# Patient Record
Sex: FEMALE | Race: WHITE
[De-identification: names, ages, dates, MRNs, and addresses within clinical notes are randomized per-mention and may not be internally consistent; named-entity substitution may affect disease eponyms.]

---

## 2017-02-13 ENCOUNTER — HOSPITAL ENCOUNTER (INPATIENT)
Dept: HOSPITAL 68 - ERH | Age: 67
LOS: 2 days | DRG: 190 | End: 2017-02-15
Attending: INTERNAL MEDICINE | Admitting: INTERNAL MEDICINE
Payer: COMMERCIAL

## 2017-02-13 VITALS — BODY MASS INDEX: 29.23 KG/M2 | WEIGHT: 165 LBS | HEIGHT: 63 IN

## 2017-02-13 DIAGNOSIS — Z99.81: ICD-10-CM

## 2017-02-13 DIAGNOSIS — Z87.891: ICD-10-CM

## 2017-02-13 DIAGNOSIS — J44.1: Primary | ICD-10-CM

## 2017-02-13 DIAGNOSIS — M19.90: ICD-10-CM

## 2017-02-13 DIAGNOSIS — E78.5: ICD-10-CM

## 2017-02-13 DIAGNOSIS — M79.7: ICD-10-CM

## 2017-02-13 DIAGNOSIS — Z79.84: ICD-10-CM

## 2017-02-13 DIAGNOSIS — J96.21: ICD-10-CM

## 2017-02-13 DIAGNOSIS — E11.9: ICD-10-CM

## 2017-02-13 LAB
ABSOLUTE GRANULOCYTE CT: 4.2 /CUMM (ref 1.4–6.5)
APTT BLD: 29 SEC (ref 25–37)
BASOPHILS # BLD: 0 /CUMM (ref 0–0.2)
BASOPHILS NFR BLD: 0.3 % (ref 0–2)
EOSINOPHIL # BLD: 0.3 /CUMM (ref 0–0.7)
EOSINOPHIL NFR BLD: 4.2 % (ref 0–5)
ERYTHROCYTE [DISTWIDTH] IN BLOOD BY AUTOMATED COUNT: 13.1 % (ref 11.5–14.5)
GRANULOCYTES NFR BLD: 57.4 % (ref 42.2–75.2)
HCT VFR BLD CALC: 38.8 % (ref 37–47)
LYMPHOCYTES # BLD: 2.1 /CUMM (ref 1.2–3.4)
MCH RBC QN AUTO: 30 PG (ref 27–31)
MCHC RBC AUTO-ENTMCNC: 33.7 G/DL (ref 33–37)
MCV RBC AUTO: 88.8 FL (ref 81–99)
MONOCYTES # BLD: 0.7 /CUMM (ref 0.1–0.6)
PLATELET # BLD: 273 /CUMM (ref 130–400)
PMV BLD AUTO: 8.6 FL (ref 7.4–10.4)
PROTHROMBIN TIME: 10.6 SEC (ref 9.4–12.5)
RED BLOOD CELL CT: 4.37 /CUMM (ref 4.2–5.4)
WBC # BLD AUTO: 7.2 /CUMM (ref 4.8–10.8)

## 2017-02-13 PROCEDURE — 2NBSP: CPT

## 2017-02-13 NOTE — ULTRASOUND REPORT
EXAMINATION:
US TRIPLEX LOWER EXTREMITY, BILATERAL
 
CLINICAL INFORMATION:
Cramping.
 
COMPARISON:
None
 
TECHNIQUE:
Color-flow triplex imaging with spectral analysis and compression Doppler
were performed on the bilateral lower extremities.
 
FINDINGS:
Respiratory variation, normal compression and augmented flow are noted
throughout the bilateral lower extremities. The visualized common femoral
vein, superficial femoral vein, profunda femoral vein, popliteal vein and
midcalf peroneal and posterior tibial venous segments show no evidence of
deep venous thrombosis.
 
There is no Baker's cyst.
 
IMPRESSION:
Normal triplex scan without evidence of deep venous thrombosis involving the
bilateral lower extremities.

## 2017-02-13 NOTE — NUR
IV ACCESS ESTABLISHED #20 LFA
LABS DRAWN/SENT (SST, LAVENDER AND BLUE TOP TUBES)
RESP PAGED FOR NEB TREATMENT

## 2017-02-13 NOTE — ED DYSPNEA/ASTHMA COMPLAINT
History of Present Illness
 
General
Chief Complaint: Dyspnea (COPD, CHF, Other)
Stated Complaint: LOW O2 SENT BY JOSE
Source: patient, old records
Exam Limitations: no limitations
 
Vital Signs & Intake/Output
Vital Signs & Intake/Output
 Vital Signs
 
 
Date Time Temp Pulse Resp B/P Pulse O2 O2 Flow FiO2
 
     Ox Delivery Rate 
 
02/15 0000     96 Nasal 1.0L 
 
      Cannula  
 
 2340 98.6 80 20 90/50 94 Nasal 2.0L 
 
      Cannula  
 
 2046     94 Nasal 2.0L 
 
      Cannula  
 
 1702     94 Nasal 2.0L 
 
      Cannula  
 
 1558 98.5 84 19 110/60 96 Nasal 2.0L 
 
      Cannula  
 
 1549      Nasal 2.0L 
 
      Cannula  
 
 
 ED Intake and Output
 
 
 02/15 0000  1200
 
Intake Total 1070 260
 
Output Total  
 
Balance 1070 260
 
   
 
Intake, IV 50 20
 
Intake, Oral 1020 240
 
Number 0 1
 
Bowel  
 
Movements  
 
 
Allergies
Coded Allergies:
ampicillin (UNKNOWN 16)
atorvastatin (UNKNOWN 16)
ciprofloxacin (From CIPRO) (UNKNOWN 16)
dicyclomine (UNKNOWN 16)
estradiol (UNKNOWN 16)
simvastatin (UNKNOWN 16)
sulfamethoxazole (From SEPTRA) (DIARRHEA 16)
trimethoprim (From SEPTRA) (DIARRHEA 16)
 
Triage Note:
PT TO C/O SOB. STATES SHE WEARS O2 2.5L AT NIGHT.
 PT CALLED DR GARCIA WHO ADVISED PT TO COME TO ED
 FOR EVAL. RA SATS 94% IN TRIAGE. NO OBVIOUS RESP
 DISTRESS NOTED. PT DENIES PAIN.
Triage Nurses Notes Reviewed? yes
Onset: Gradual
Duration: day(s): (5)
Timing: recent history
Severity: moderate
Activities at Onset: none
Prior Episodes/Possible Cause: occasional episodes
Modifying Factors:
Improves With: immobilization. 
HPI:
Patient is a 66-year-old female with history of COPD presenting to the emergency
department which chief complaint increasing shortness of breath 5 days.  She 
reports that she usually uses 2 L nasal cannula at night but has required oxygen
all day everyday for 5 days.  She called her pulmonologist who told her to come 
to the emergency department for evaluation.  She reports nonproductive cough.  
No fevers or chills.  No centrality or denies recent antibiotic use.  Exertion 
seems to make the symptoms worse nothing seems to make it better.  Patient also 
reports she feels tight and has been wheezing more often.  Denies chest pain or 
palpitations.
(MARGARET DHILLON,MISHA)
Reconcile Medications
Albuterol Sulfate (Ventolin Hfa) 90 MCG HFA.AER.AD   2 PUF INH Q4-6 PRN PRN 
BREATHING PROBLEMS  (Reported)
Amitriptyline HCl 25 MG TABLET   1 TAB PO QPM FIBRO  (Reported)
Cholecalciferol (Vitamin D3) (Vitamin D) 2,000 UNIT TABLET   4,000 UNIT PO DAILY
SUPP  (Reported)
Citalopram Hydrobromide (Citalopram HBr) 40 MG TABLET   1 TAB PO DAILY MENTAL 
HEALTH  (Reported)
Clonazepam 0.5 MG TABLET   1 TAB PO DAILY AS NEEDED AXNITY  (Reported)
Cyanocobalamin (Vitamin B-12) 1,000 MCG TABLET   1 TAB PO DAILY SUPP  (Reported)
Fish Oil/Borage/Flax/Om3,6,9#1 (Omega 3-6-9 1,200 MG Softgel) 1,200 MG CAPSULE  
1 CAP PO DAILY SUUP  (Reported)
Fluticasone/Salmeterol (Advair 250-50 Diskus) 250 MCG-50 MCG/DOSE BLST.W.DEV   1
PUF INH BID BREATHING PROBLEMS  (Reported)
Melatonin (Melatin) 3 MG TABLET   3 MG PO DAILY INSOMNIA  (Reported)
Metformin HCl (Metformin HCl ER) 500 MG TAB.ER.24H   1 TAB PO SEE ADMIN CRITERIA
DIABETES  (Reported)
     1 TAB MORNING 2 TAB EVENING
Pantoprazole Sodium 40 MG TABLET.DR   1 TAB PO DAILY GI  (Reported)
Rosuvastatin Calcium (Crestor) 10 MG TABLET   1 TAB PO DAILY CHOLESTEROL  (
Reported)
Tiotropium Bromide (Spiriva) 18 MCG CAP.W.DEV   1 CAP INH DAILY BREATHING 
PROBLEMS  (Reported)
 
(YONAS WALTER,JONATHAN)
 
Past History
 
Travel History
Traveled to [place] past 21 day No
 
Medical History
Any Pertinent Medical History? see below for history
Respiratory: COPD, 02 PRN
Musculoskeletal: fibromyalgia, osteoarthritis
Endocrine: diabetes
Pneumonia Vaccine: 11
Influenza Vaccine: 10/27/15
 
Surgical History
Surgical History: non-contributory
 
Psychosocial History
Who do you live with Spouse
[facility] at Home Oxygen
What is your primary language English
Tobacco Use: Quit >30 days ago
ETOH Use: denies use
Illicit Drug Use: denies illicit drug use
 
Family History
Family History, If Any:
Relation not specified for:
  FH: diabetes mellitus
  FH: lung cancer
 
Hx Contributory? No
(MISHA DE JESUS)
 
Review of Systems
 
Review of Systems
Constitutional:
Reports: no symptoms. 
Comments
Review of systems: See HPI, All other systems negative.
Constitutional, no chills fever or weight loss
HEENT: No visual changes no sore throat
Cardiovascular: No chest pain ,palpitation , orthopnea or ankle swelling
Skin, no jaundice no rashes
Respiratory: No sputum or hemoptysis
GI: No nausea no vomiting
: No dysuria No hematuria
Muscle skeletal: no back pain, no neck pain,
Neurologic: No numbness no confusion, no headaches
Psych: No stress anxiety 
Immunology: No splenectomy or history of AIDS
(MISHA DE JESUS)
 
Physical Exam
 
Physical Exam
General Appearance: well developed/nourished, no apparent distress, alert, awake
, comfortable
Respiratory: chest non-tender, no respiratory distress, wheezing
Comments:
Well-developed well-nourished person in no acute distress
HEENT: Pupils equally round and reactive to light and accommodation. Nose is 
atraumatic. External auditory canal and Tympanic membranes clear. Pharynx 
normal. No swelling or edema. 
Neck: Supple, no lymphadenopathy, normal range of motion without pain or 
tenderness
Back: Nontender, no CVA tenderness. Full range of motion
Cardiovascular: Regular rate and rhythms no murmurs rubs or gallops, normal JVP
Respiratory: Chest nontender. No respiratory distress.mild wheezing to 
auscultation bilaterally, diminished at bases bilaterally.
Extremity: No edema, no calf tenderness to palpation, normal and equal pulses.
Neuro: Alert oriented x3
Skin: No appreciable rash on exposed skin, skin is warm and dry.
Psych: Mood and affect is normal, memory and judgment is normal.
 
Core Measures
ACS in differential dx? Yes
Severe Sepsis Present: No
Septic Shock Present: No
(MISHA DE JESUS)
 
Progress
Differential Diagnosis: copd EXACERBATION, ASTHMA, PNEUMONIA, BRONCHITIS, 
PNEUMOTHORAX, acs, chf,
Plan of Care:
 Orders
 
 
Procedure Date/time Status
 
CBC WITHOUT DIFFERENTIAL 02/15 0600 Complete
 
Anticipated Discharge 02/15  UNK Active
 
RT: Evaluation  1224 Active
 
TRC EVALUATION (GEN)   UNK Complete
 
THERAPIST ORDERS   UNK Complete
 
OXYGEN SETUP (GEN)   UNK Complete
 
 
 Current Medications
 
 
  Sig/Bruna Start time  Last
 
Medication Dose  Stop Time Status Admin
 
Acetaminophen 650 MG Q6P PRN 1745 AC 
 
(Tylenol)     
 
Clonazepam 0.5 MG DAILY AS NEEDED 1745 AC 
 
(KlonoPIN)   1744  
 
 
 Laboratory Tests
 
 
 
02/15/17 0612:
CBC w Diff NO MAN DIFF REQ, RBC 4.07  L, MCV 89.7, MCH 30.1, RDW 13.3, MPV 8.9, 
Gran % 82.4  H, Lymphocytes % 11.2  L, Monocytes % 6.1, Eosinophils % 0.1, 
Basophils % 0.2, Absolute Granulocytes 11.6  H, Absolute Lymphocytes 1.6, 
Absolute Monocytes 0.9  H, Absolute Eosinophils 0, Absolute Basophils 0, PUBS 
MCHC 33.6
Diagnostic Imaging:
Viewed by Me: Radiology Read.  Discussed w/RAD: Radiology Read. 
Radiology Impression: RESENT AGE: 66  PATIENT ACCOUNT NO: [id number] : 49 
LOCATION: [facility] ORDERING PHYSICIAN: MISHA DHILLON     SERVICE DATE: 17 EXAM TYPE: RAD - XRY-CHEST XRAY, PA AND LATERAL EXAMINATION: XR CHEST 
CLINICAL INFORMATION: Cough and shortness of breath COMPARISON: CXR from 2015 and chest CT from 2016 TECHNIQUE: 2 views of the chest were obtained.
FINDINGS: There is pulmonary emphysema associated with enhanced, but stable 
reticular markings in both lungs. No acute pulmonary consolidation, interstitial
edema, hilar lymphadenopathy or pleural effusion. There is surgical change from 
wedge resection from the medial right apex. Cardiac silhouette remains normal in
size. There is atherosclerotic calcification of the aortic arch. No acute 
findings within the mildly degenerated thoracic spine. IMPRESSION: 1. Pulmonary 
emphysema. 2. No evidence of pneumonia or other acute pulmonary process compared
to 2015. DICTATED BY: BAO ROCK MD  DATE/TIME DICTATED:17 :RAD.WALKER  DATE/TIME TRANSCRIBED:17 
CONFIDENTIAL, DO NOT COPY WITHOUT APPROPRIATE AUTHORIZATION.  <Electronically 
signed in Other Vendor System>                                                  
                                     SIGNED BY: BAO ROCK MD 17 [id number]
Initial ED EKG: SINUS RHYTHM AT 81 BPM, BORDERLINE LEFT AXIS DEVIATION
Comments:
 
 
On arrival patient no acute distress, oxygen saturation on room air is 93, it 
does trend down to 89 with movement.  Patient placed  on oxygen for comfort.  We
will assess chest x-ray, CBC, CMP,, troponin.  Patient does have bilateral 
wheezing and diminished lung sounds bilaterally.  Patient will have albuterol 
treatment.
 
Patient is not tachycardic, patient not tachypneic.  No unilateral leg swelling.
 No recent surgery.  I do not think this person has a pulmonary embolus.  
Patient appears comfortable.  No acute distress.  She'll also receive IV Solu-
Medrol.
 
 
2017 2:36:02 PM spoke with Dr. Le, patient's pulmonologist, patient 
will be admitted for COPD exacerbation.  Patient desaturates to 87% on room air 
after ambulation. 
(MISHA DE JESUS)
 
Departure
 
Departure
Time of Disposition: 1528
Disposition: STILL A PATIENT
Condition: Stable
Clinical Impression
Primary Impression: COPD exacerbation
Referrals:
AUSTYN WALTER,SCOTT GRACIA (PCP/Family)
 
Departure Forms:
Customer Survey
General Discharge Information
 
Admission Note
Spoke With:
LUZ ELENA MARTINEZ MD
Documentation of Exam:
Documentation of any treatments & extenuating circumstances including Concerns 
Regarding Discharge (functional status, medication knowledge or non-compliance, 
living conditions, etc.) that warrant an admission rather than observation:  
Patient requiring several DuoNeb treatments, pulmonologist consultation, IV 
antibiotics, titration from oxygen as patient is requiring more oxygen than 
baseline.
 
(MISHA DE JESUS)
 
PA/NP Co-Sign Statement
Statement:
ED Attending supervision documentation-
 
[X] I saw and evaluated the patient. I have also reviewed all the pertinent lab 
results and diagnostic results. I agree with the findings and the plan of care 
as documented in the PA's/NP's documentation. 
 
[X] I have reviewed the ED Record and agree with the PA's/NP's documentation.
 
[] Additions or exceptions (if any) to the PAs/NP's note and plan are 
summarized below:
[]
 
(YONAS WALTER,JONATHAN)
 
Critical Care Note
 
Critical Care Note
Critical Care Time: non-applicable
(MISHA DE JESUS)

## 2017-02-13 NOTE — NUR
PT AMBULATED IN HALLWAY, SATS MAINTAINED AT 90-91% WHILE WALKING BUT [facility] PT STATES "I FEEL LIKE I JUST RAN A
MARATHON".
ALEJO ROBLES AWARE OF SAME.

## 2017-02-13 NOTE — HISTORY & PHYSICAL
OTILIA PATTEN MD 02/13/17 3590:
General Information and HPI
MD Statement:
I have seen and personally examined ANNABELLE ASTUDILLO and documented this H&P.
 
The patient is a 66 year old F who presented with a patient stated chief 
complaint of low oxygen saturations.
 
Source of Information: patient
Exam Limitations: no limitations
History of Present Illness:
 Ms. Astudillo is a pleasant 66 year old  female with PMH COPD on home O2
as needed and followed by Dr. Preciado, fibromyalgia, osteoarthritis and type 2 
diabetes mellitus who presented to the [place] ED after a 5 day history of low 
oxygen saturations. Patient reports that over the last few days, she has noted 
oxygen saturations as low as 82-89% with minimal exertion while on supplemental 
oxygen to about 2 L. She is normally not on oxygen during the day but has 
required it secondary to dizziness and lethargy when her oxygen saturations 
drop. She endorses lethargy, dizziness and substernal chest pain when her oxygen
levels drop.  She also endorses recent increase in her inhaler usage, though she
ran out of her nebulizer this last month.She denies fever, chills, palpitations,
cough, wheezing, sputum production, abodminal pain, weakness, recent travel or 
recent sick contacts.
 Social history is significant for a 34 pack year history of smoking 2-3 ppd. 
She quit 19 years ago. Her cardiologist is Dr. Fay and her pulmonologist is 
Dr. Preciado.
 
Allergies/Medications
Allergies:
Coded Allergies:
ampicillin (UNKNOWN 04/12/16)
atorvastatin (UNKNOWN 04/12/16)
ciprofloxacin (From CIPRO) (UNKNOWN 04/12/16)
dicyclomine (UNKNOWN 04/12/16)
estradiol (UNKNOWN 04/12/16)
simvastatin (UNKNOWN 04/12/16)
sulfamethoxazole (From SEPTRA) (DIARRHEA 04/12/16)
trimethoprim (From SEPTRA) (DIARRHEA 04/12/16)
 
Compliance With Home Meds: GOOD
 
Past History
 
Travel History
Traveled to [place] past [place] day No
 
Medical History
Respiratory: COPD, 02 PRN
Musculoskeletal: fibromyalgia, osteoarthritis
Endocrine: diabetes
Pneumonia Vaccine: 06/24/11
Influenza Vaccine: 10/27/15
 
Surgical History
Surgical History: non-contributory
 
Past Family/Social History
 
Family History
Relations & Conditions if any
Relation not specified for:
  FH: diabetes mellitus
  FH: lung cancer
 
 
Psychosocial History
Where do you live? Home
Services at Home: Oxygen
Primary Language: English
Smoking Status: Former Smoker
ETOH Use: denies use
Illicit Drug Use: denies illicit drug use
 
Functional Ability
ADLs
Independent: dressing, eating, toileting, bathing. 
Ambulation: independent
IADLs
Independent: shopping, housework, finances, food prep, telephone, transportation
, medication admin. 
 
Review of Systems
 
Review of Systems
Constitutional:
Reports: malaise.  Denies: chills, fever, weakness. 
EENTM:
Denies: blurred vision, visual changes, nasal congestion, throat pain. 
Cardiovascular:
Denies: chest pain, palpitations, peripheral edema, syncope. 
Respiratory:
Reports: short of breath.  Denies: cough, hemoptysis, sputum production, 
wheezing. 
GI:
Denies: abdominal pain, bloating, constipation, diarrhea. 
Genitourinary:
Denies: dysuria. 
Musculoskeletal:
Denies: back pain. 
Skin:
Denies: change in skin color, change in hair/nails. 
Neurological/Psychological:
Denies: confusion, headache, paresthesia, tingling. 
Hematologic/Endocrine:
Denies: bruising, bleeding. 
Immunologic/Allergic:
Denies: splenectomy. 
 
Exam & Diagnostic Data
Last 24 Hrs of Vital Signs/I&O
 Vital Signs
 
 
Date Time Temp Pulse Resp B/P Pulse O2 O2 Flow FiO2
 
     Ox Delivery Rate 
 
02/13 1810 98.4 98 20 111/71 93 Nasal 2.0L 
 
      Cannula  
 
02/13 1425 98.0 87 20 119/79 93 Room Air  
 
02/13 1314     93   
 
02/13 1300     91 Room Air Room Air 
 
02/13 1254 97.0 81 22 112/67 90 Room Air  
 
02/13 1202 96.6 96 20 115/75 94 Room Air  
 
 
 Intake & Output
 
 
 02/13 1600 02/13 0800 02/13 0000
 
Intake Total 490  
 
Output Total   
 
Balance 490  
 
    
 
Intake,   
 
Intake, Oral 240  
 
Patient 165 lb  
 
Weight   
 
 
 
 
Physical Exam
General Appearance Alert, Oriented X3, Cooperative, No Acute Distress
Skin No Significant Lesion
HEENT Atraumatic, PERRLA, Mucous Membr. moist/pink
Neck Supple, No JVD
Lymphatic Cervical nl
Cardiovascular Regular Rate, Normal S1, Normal S2, No Murmurs
Lungs Normal Air Movement, Rhonchi bilateral lung fields
Abdomen Normal Bowel Sounds, Soft, No Tenderness, No Hepatospenomegaly
Neurological Normal Gait, Normal Speech, Strength at 5/5 X4 Ext, Normal Tone
Extremities No Clubbing, No Cyanosis, No Edema, Mild tenderness to palpation of 
left calf, no erythema, negative Homans.
Vascular Pulses Symmetrical
Last 24 Hrs of Labs/[place]:
 Laboratory Tests
 
02/13/17 1245:
Anion Gap 12, Estimated GFR > 60, BUN/Creatinine Ratio 21.1, Glucose 94, Calcium
9.8, Magnesium 2.2, Total Bilirubin 0.9, AST 23, ALT 34, Alkaline Phosphatase 53
, Troponin I < 0.01, Total Protein 6.9, Albumin 4.2, Globulin 2.7, Albumin/
Globulin Ratio 1.6, PT 10.6, INR 1.01, APTT 29, CBC w Diff NO MAN DIFF REQ, RBC 
4.37, MCV 88.8, MCH 30.0, RDW 13.1, MPV 8.6, Gran % 57.4, Lymphocytes % 28.5, 
Monocytes % 9.6  H, Eosinophils % 4.2, Basophils % 0.3, Absolute Granulocytes 
4.2, Absolute Lymphocytes 2.1, Absolute Monocytes 0.7  H, Absolute Eosinophils 
0.3, Absolute Basophils 0, PUBS MCHC 33.7
 
 
Diagnostic Data
EKG Results
NSR HR 81, .
CXR Results
EXAMINATION:
XR CHEST
 
CLINICAL INFORMATION:
Cough and shortness of breath
 
COMPARISON:
CXR from 11/18/2015 and chest CT from 08/18/2016
 
TECHNIQUE:
2 views of the chest were obtained.
 
FINDINGS:
There is pulmonary emphysema associated with enhanced, but stable reticular
markings in both lungs. No acute pulmonary consolidation, interstitial edema,
hilar lymphadenopathy or pleural effusion. There is surgical change from
wedge resection from the medial right apex. Cardiac silhouette remains normal
in size. There is atherosclerotic calcification of the aortic arch. No acute
findings within the mildly degenerated thoracic spine.
 
IMPRESSION:
1. Pulmonary emphysema.
2. No evidence of pneumonia or other acute pulmonary process compared to
11/18/2015.
 
Assessment/Plan
Assessment:
Ms. Astudillo is a pleasant 66 year old female with PMH COPD on home O2 as needed 
and followed by Dr. Preciado, fibromyalgia, osteoarthritis and type 2 diabetes 
mellitus who presents with a 5 day history of low oxygen saturations. Patient 
has a home oximeter and has been using it more frequently as she has been 
feeling lethargic and occasionally dizzy on ambulation. With these low O2 
saturations, she has been required to wear her home O2 continuously as compared 
to just at night. Patient notes that her oxygen level has dropped as low as 82% 
at home while walking up one flight of stairs.
 
In the ED: Vital signs showed T 96.6, HR 96, RR 20, /75 and O2 saturation 
of 94% on 3 L NC. Labs showed normal CBC, unremarkable BEP except for slightly 
elevated BUN to 19, Mg 2.2, trop <0.01 and INR 1.01.
 
CXR was done and showed pulmonary emphysema. EKG was done and showed NSR at HR 
81 and .
 
Patient was admitted to the general medicine floor and the following is the 
management:
 
1. Acute hypoxic respiratory failure secondary to COPD exacerbation
* Increasing O2 requirements along with dropping O2 saturations with occasional 
wheeze on lung examination
* Provide continuous O2 for now, taper down as tolerated
* TRC nebs, symbicort, spiriva
* IV solumedrol 40 mg IV Q8H
* Zithromax x 5 days
* Pulm consult with Dr. Preciado appreciated, follow recommendations
 
2. Diabetes mellitus, type 2
* Hold metformin
* Accuchecks TIDAC/HS
* Novolog sliding scale started
* Monitor FSGs in setting of high dose steroids
 
3. Mental health
* Continue celexa and amitriptyline QPM
* Klonopin 0.5 mg PO daily as needed
 
FULL CODE
DVTP: SC Lovenox
Mild pain pathway
Diabetic diet
 
As Ranked By This Provider
Problem List:
 1. Chronic obstructive lung disease
 
 2. Fibromyalgia
 
 3. DVT prophylaxis
 
 
Core Measures/Miscellaneous
 
Acute Coronary Syndrome
ACS Diagnosis: No
 
Cerebrovascular Accident
CVA/TIA Diagnosis: No
 
Congestive Heart Failure
CHF Diagnosis: No
 
Venous Thromboembolism
VTE Risk Factors: Acute medical illness, Age > 40
VTE Prophylaxis Ordered Inpt: Mech & Pharm
No Mech VTE prophylaxis d/t: No contraindications
No VTE Pharm Prophylaxis d/t: No contraindications
VTE Diagnosis: No
VTE Type: NONE
VTE Confirmed by (Test): NONE
 
Severe Sepsis
Severe Sepsis Present: No
 
Septic Shock
Septic Shock Present: No
 
Miscellaneous Documentation
Attending Case Discussed With:
LUZ ELENA MARTINEZ MD
 
Primary Care Physician:
SCOTT ZAMAN MD
 
Patient sees these Specialists
Dr. Fay, cardiology
Dr. Preciado, Pulmonology
Level of Patient Care: General Medicine
 
 
JL DANIELSON 02/13/17 2119:
Resident Review Statement
Resident Statement: examined this patient, discussed with intern, amended to 
note
Other Findings:
66-year-old lady with past medical history of COPD on oxygen at night, 
fibromyalgia, diabetic type II came with chief complaint of desaturation for 
about 5 days.  Patient reported she was not feeling well about 5 days ago and 
her O2 saturation was around 85% on room air and she also complained of 
shortness of breath this days.  Patient was using her oxygen during the days as 
well.  Patient reports having minimal cough and wheezing today.  Patient denies 
any fever, sick contacts, chills, nausea, vomiting, abdominal pain.  Patient 
does report of mild left calf pain.
 
She called Dr. Preciado and was advised to come to the ED.  She desaturated to 
87% on ambulation on room air.
 
Vital signs are noted above
Physical exam is notable for bilateral upper mild expiratory wheezing
 
CBC, troponin, BEP were unremarkable
 
EKG NSR HR 81, .
 
CXR 
1. Pulmonary emphysema.
2. No evidence of pneumonia or other acute pulmonary process compared to
11/18/2015.
 
Assessment and plan
 
COPD exacerbation
* Admit the patient to general floor
* Keep O2 saturation over 92%
* TRC nebs
* IV Solu-Medrol 40 mg  every 8 hours
* IV azithromycin 250 mg dailyfor  3-5 days
 
Left leg pain
* rule out DVT with doppler ultrasound
 
Hyperlipidemia, anxiety, ,depression, GERD
* Continue home medications
 
Diabetes
* Metformin, sliding scale insulin, fingersticks, diabetic diet
 
DVT Prophylaxis is mechanical and Lovenox, full code, Tylenol for pain, diabetic
diet
 
LUZ ELENA MARTINEZ 02/15/17 1111:
General Information and HPI
 
Allergies/Medications
Home Med list
Albuterol Sulfate (Ventolin Hfa) 90 MCG HFA.AER.AD   2 PUF INH Q4-6 PRN PRN 
BREATHING PROBLEMS  (Reported)
Amitriptyline HCl 25 MG TABLET   1 TAB PO QPM FIBRO  (Reported)
Cholecalciferol (Vitamin D3) (Vitamin D) 2,000 UNIT TABLET   4,000 UNIT PO DAILY
SUPP  (Reported)
Citalopram Hydrobromide (Citalopram HBr) 40 MG TABLET   1 TAB PO DAILY MENTAL 
HEALTH  (Reported)
Clonazepam 0.5 MG TABLET   1 TAB PO DAILY AS NEEDED AXNITY  (Reported)
Cyanocobalamin (Vitamin B-12) 1,000 MCG TABLET   1 TAB PO DAILY SUPP  (Reported)
Fish Oil/Borage/Flax/Om3,6,9#1 (Omega 3-6-9 1,200 MG Softgel) 1,200 MG CAPSULE  
1 CAP PO DAILY SUUP  (Reported)
Fluticasone/Salmeterol (Advair 250-50 Diskus) 250 MCG-50 MCG/DOSE BLST.W.DEV   1
PUF INH BID BREATHING PROBLEMS  (Reported)
Melatonin (Melatin) 3 MG TABLET   3 MG PO DAILY INSOMNIA  (Reported)
Metformin HCl (Metformin HCl ER) 500 MG TAB.ER.24H   1 TAB PO SEE ADMIN CRITERIA
DIABETES  (Reported)
     1 TAB MORNING 2 TAB EVENING
Pantoprazole Sodium 40 MG TABLET.DR   1 TAB PO DAILY GI  (Reported)
Prednisone 10 MG TABLET   1 TAB OTHER SI COPD exacerbation
     Please:
     take 4 tabs on 2/16/17 then
     take 3 tabs on 2/17/17 and 2/18/17 then
     take 2 tabs on 2/19/17 and 2/20/17 then
     take 1 tab on 2/21/17 and 2/22/17  then
      
     stop.
Rosuvastatin Calcium (Crestor) 10 MG TABLET   1 TAB PO DAILY CHOLESTEROL  (
Reported)
Tiotropium Bromide (Spiriva) 18 MCG CAP.W.DEV   1 CAP INH DAILY BREATHING 
PROBLEMS  (Reported)
 
 
 
Attending MD Review Statement
 
Attending Statement
Attending MD Statement: examined this patient, discuss w/resident/PA/NP, agreed 
w/resident/PA/NP, reviewed EMR data (avail)
Attending Assessment/Plan:
66-year-old female with past medical history of COPD on nocturnal oxygen at 2 L 
at home who presented with chief complaint of coughing.  Patient over the last 
few days has been using oxygen 24 hours a day at 3 L due to her subjective 
feeling of shortness of breath and decreased oxygen saturation on pulse oximetry
at home.
 
Patient denies any fevers or chills and any sick contacts.  Patient in ER was 
found to have oral to saturations of 88% on room air and also worsening of her 
O2 sats was seen with ambulation.  Patient is being admitted for COPD 
exacerbation.  On exam she was found to have rhonchi bilateral lung fields.  
 
Assessment and plan-acute COPD exacerbation with hypoxic respiratory failure in 
patient with chronic respiratory failure.  Patient was given IV steroids in the 
ER and chest x-ray done was negative for any infiltrates.  We will admit her to 
medicine.  We'll continue her on steroids and we'll also give her Zithromax.  
Will get respiratory consult to do nebulization.
 
Left leg pain will get DVT ultrasound to rule out deep venous thrombosis.

## 2017-02-13 NOTE — CONS- PULMONARY
General Information and HPI
 
Consulting Request
Date of Consult: 02/13/17
Requested By:
Dr. Perry
Reason for Consult:
COPD exacerbation
Source of Information: patient, old records
Exam Limitations: no limitations
History of Present Illness:
The patient is a 66-year-old female well-known to me from previous outpatient 
visits and hospitalizations.  The patient has a history of COPD, noting she has 
typically not oxygen dependent.  She uses supplemental oxygen at night for 
nocturnal hypoxia.  The patient contacted my office earlier today with a 5 day 
complaint of increased shortness of breath.  The patient had been using her 
oxygen around the clock without relief.  She has had a nonproductive cough.  She
has increased wheezing and chest congestion.  She has increased chest tightness.
She was evaluated in the ED and found to have oxygen desaturation in the 80s 
with ambulation, and she was reported as being very short of breath.
 
Allergies/Medications
Allergies:
Coded Allergies:
ampicillin (UNKNOWN 04/12/16)
atorvastatin (UNKNOWN 04/12/16)
ciprofloxacin (From CIPRO) (UNKNOWN 04/12/16)
dicyclomine (UNKNOWN 04/12/16)
estradiol (UNKNOWN 04/12/16)
simvastatin (UNKNOWN 04/12/16)
sulfamethoxazole (From SEPTRA) (DIARRHEA 04/12/16)
trimethoprim (From SEPTRA) (DIARRHEA 04/12/16)
 
Home Med List:
Albuterol Sulfate (Ventolin Hfa) 90 MCG HFA.AER.AD   2 PUF INH Q4-6 PRN PRN 
BREATHING PROBLEMS  (Reported)
Amitriptyline HCl 25 MG TABLET   1 TAB PO QPM FIBRO  (Reported)
Cholecalciferol (Vitamin D3) (Vitamin D) 2,000 UNIT TABLET   4,000 UNIT PO DAILY
SUPP  (Reported)
Citalopram Hydrobromide (Citalopram HBr) 40 MG TABLET   1 TAB PO DAILY MENTAL 
HEALTH  (Reported)
Clonazepam 0.5 MG TABLET   1 TAB PO DAILY AS NEEDED AXNITY  (Reported)
Cyanocobalamin (Vitamin B-12) 1,000 MCG TABLET   1 TAB PO DAILY SUPP  (Reported)
Fish Oil/Borage/Flax/Om3,6,9#1 (Omega 3-6-9 1,200 MG Softgel) 1,200 MG CAPSULE  
1 CAP PO DAILY SUUP  (Reported)
Fluticasone/Salmeterol (Advair 250-50 Diskus) 250 MCG-50 MCG/DOSE BLST.W.DEV   1
PUF INH BID BREATHING PROBLEMS  (Reported)
Melatonin (Melatin) 3 MG TABLET   3 MG PO DAILY INSOMNIA  (Reported)
Metformin HCl (Metformin HCl ER) 500 MG TAB.ER.24H   1 TAB PO SEE ADMIN CRITERIA
DIABETES  (Reported)
     1 TAB MORNING 2 TAB EVENING
Pantoprazole Sodium 40 MG TABLET.DR   1 TAB PO DAILY GI  (Reported)
Rosuvastatin Calcium (Crestor) 10 MG TABLET   1 TAB PO DAILY CHOLESTEROL  (
Reported)
Tiotropium Bromide (Spiriva) 18 MCG CAP.W.DEV   1 CAP INH DAILY BREATHING 
PROBLEMS  (Reported)
 
Current Medications:
 Current Medications
 
 
  Sig/Bruna Start time  Last
 
Medication Dose Route Stop Time Status Admin
 
Albuterol Sulfate 3 ML ONCE ONE 02/13 1230 DC 02/13
 
  INH 02/13 1231  1313
 
Azithromycin 500 MG ONCE ONE 02/13 1445 DC 02/13
 
Dextrose/Water 250 ML IV 02/13 1544  1524
 
Methylprednisolone 0 .STK-MED ONE 02/13 1253 DC 
 
  .ROUTE   
 
Methylprednisolone 125 MG ONCE ONE 02/13 1230 DC 02/13
 
  IV 02/13 1231  1255
 
 
 
 
Past History
 
Travel History
Traveled to [place] past [place] day No
 
Medical History
Respiratory: COPD, 02 PRN
Musculoskeletal: fibromyalgia, osteoarthritis
Endocrine: diabetes
 
Surgical History
Surgical History: non-contributory
 
Family History
Relations & Conditions If Any:
Relation not specified for:
  FH: diabetes mellitus
  FH: lung cancer
 
 
Psychosocial History
Services at Home: Oxygen
ETOH Use: denies use
Illicit Drug Use: denies illicit drug use
 
Exam & Diagnostic Data
Last 24 Hrs of Vital Signs/I&O
 Vital Signs
 
 
Date Time Temp Pulse Resp B/P Pulse O2 O2 Flow FiO2
 
     Ox Delivery Rate 
 
02/13 1425 98.0 87 20 119/79 93 Room Air  
 
02/13 1314     93   
 
02/13 1300     91 Room Air Room Air 
 
02/13 1254 97.0 81 22 112/67 90 Room Air  
 
02/13 1202 96.6 96 20 115/75 94 Room Air  
 
 
 Intake & Output
 
 
 02/13 1600 02/13 0800 02/13 0000
 
Intake Total 490  
 
Output Total   
 
Balance 490  
 
    
 
Intake,   
 
Intake, Oral 240  
 
Patient 165 lb  
 
Weight   
 
 
 
 
Physical Exam
General Appearance: no apparent distress, alert, comfortable
Head: atraumatic, normal appearance
Eyes:
Bilateral: PERRL. 
Neck: supple
Respiratory: no respiratory distress, lungs clear, wheezing
Cardiovascular: regular rate/rhythm
Gastrointestinal: normal bowel sounds, soft, non-tender
Extremities: no edema
Skin: intact, normal color, warm/dry
Last 48 Hrs of Labs/[place]:
 Laboratory Tests
 
02/13/17 1245:
Anion Gap 12, Estimated GFR > 60, BUN/Creatinine Ratio 21.1, Glucose 94, Calcium
9.8, Magnesium 2.2, Total Bilirubin 0.9, AST 23, ALT 34, Alkaline Phosphatase 53
, Troponin I < 0.01, Total Protein 6.9, Albumin 4.2, Globulin 2.7, Albumin/
Globulin Ratio 1.6, PT 10.6, INR 1.01, APTT 29, CBC w Diff NO MAN DIFF REQ, RBC 
4.37, MCV 88.8, MCH 30.0, RDW 13.1, MPV 8.6, Gran % 57.4, Lymphocytes % 28.5, 
Monocytes % 9.6  H, Eosinophils % 4.2, Basophils % 0.3, Absolute Granulocytes 
4.2, Absolute Lymphocytes 2.1, Absolute Monocytes 0.7  H, Absolute Eosinophils 
0.3, Absolute Basophils 0, PUBS MCHC 33.7
 
 
Diagnostic Data
CXR Results
1. Pulmonary emphysema.
2. No evidence of pneumonia or other acute pulmonary process compared to 11/18/
2015.
 
Assessment/Plan
Impression/Plan:
1.  Acute exacerbation of COPD.
2.  Hypoxia related to acute exacerbation of COPD.
3.  History of diabetes.
4.  Nocturnal hypoxemia.
Recommendations:
* TRC consult for nebulizer treatments.
* Solu-Medrol 40 mg IV every 8 hours.
* Azithromycin 250 mg orally daily for 5 days.
* Attempt to wean supplemental oxygen down to off.
* Monitor blood sugars while on high-dose steroids.
* Continue home inhaler regimen.
* DVT prophylaxis.
* Thank you for a [place] me to participate in the care of this patient.  I will 
follow her along with you and provide further recommendations as necessary.  As 
always, please do not hesitate to contact me at any time with any issues.
 
 
Consult Acknowledgment
- Thank you for your consult request.

## 2017-02-13 NOTE — PN- ATT ADDEND
Attending MD Review Statement
 
Attending Statement
Attending MD Statement: examined this patient, discuss w/resident/PA/NP, agreed 
w/resident/PA/NP, reviewed EMR data (avail)
Attending Assessment/Plan:
66-year-old female with past medical history of COPD on nocturnal oxygen at 2 L 
at home who presented with chief complaint of coughing.  Patient over the last 
few days has been using oxygen 24 hours a day at 3 L due to her subjective 
feeling of shortness of breath and decreased oxygen saturation on pulse oximetry
at home.
 
Patient denies any fevers or chills and any sick contacts.  Patient in ER was 
found to have oral to saturations of 88% on room air and also worsening of her 
O2 sats was seen with ambulation.  Patient is being admitted for COPD 
exacerbation.  On exam she was found to have rhonchi bilateral lung fields.  
 
Assessment and plan-acute COPD exacerbation with hypoxic respiratory failure in 
patient with chronic respiratory failure.  Patient was given IV steroids in the 
ER and chest x-ray done was negative for any infiltrates.  We will admit her to 
medicine.  We'll continue her on steroids and we'll also give her Zithromax for 
3 days 500 mg.  Will get respiratory consult to do nebulization.
 
Left leg pain will get DVT ultrasound to rule out deep venous thrombosis.
 
Discussed with patient the care plan

## 2017-02-13 NOTE — RADIOLOGY REPORT
EXAMINATION:
XR CHEST
 
CLINICAL INFORMATION:
Cough and shortness of breath
 
COMPARISON:
CXR from 11/18/2015 and chest CT from 08/18/2016
 
TECHNIQUE:
2 views of the chest were obtained.
 
FINDINGS:
There is pulmonary emphysema associated with enhanced, but stable reticular
markings in both lungs. No acute pulmonary consolidation, interstitial edema,
hilar lymphadenopathy or pleural effusion. There is surgical change from
wedge resection from the medial right apex. Cardiac silhouette remains normal
in size. There is atherosclerotic calcification of the aortic arch. No acute
findings within the mildly degenerated thoracic spine.
 
IMPRESSION:
1. Pulmonary emphysema.
2. No evidence of pneumonia or other acute pulmonary process compared to
11/18/2015.

## 2017-02-13 NOTE — ADMISSION CERTIFICATION
Admission Certification
 
Certification Statement
- As attending physician, I certify that at the time of
- admission, based on clinical presentation, severity of
- symptoms, need for further diagnostic testing and
- therapeutic interventions, and risk of adverse outcomes
- without in-hospital treatment, in my clinical assessment,
- this patient requires an acute hospital stay for a minimum
- of two nights or longer. I have also considered psychsocial
- factors such as support system, advanced age, financial
- issues, cognitive issues, and failed out-patient treatments,
- past re-admission history, safety of patient, and lack of
- compliance as applicable.
Specific rationale supporting this admission is:
Acute COPD exacerbation with hypoxic respiratory failure

## 2017-02-14 VITALS — SYSTOLIC BLOOD PRESSURE: 90 MMHG | DIASTOLIC BLOOD PRESSURE: 50 MMHG

## 2017-02-14 VITALS — SYSTOLIC BLOOD PRESSURE: 110 MMHG | DIASTOLIC BLOOD PRESSURE: 60 MMHG

## 2017-02-14 VITALS — DIASTOLIC BLOOD PRESSURE: 80 MMHG | SYSTOLIC BLOOD PRESSURE: 126 MMHG

## 2017-02-14 VITALS — DIASTOLIC BLOOD PRESSURE: 72 MMHG | SYSTOLIC BLOOD PRESSURE: 110 MMHG

## 2017-02-14 LAB
ABSOLUTE GRANULOCYTE CT: 9.4 /CUMM (ref 1.4–6.5)
BASOPHILS # BLD: 0 /CUMM (ref 0–0.2)
BASOPHILS NFR BLD: 0.2 % (ref 0–2)
EOSINOPHIL # BLD: 0 /CUMM (ref 0–0.7)
EOSINOPHIL NFR BLD: 0.1 % (ref 0–5)
ERYTHROCYTE [DISTWIDTH] IN BLOOD BY AUTOMATED COUNT: 12.9 % (ref 11.5–14.5)
GRANULOCYTES NFR BLD: 80.5 % (ref 42.2–75.2)
HCT VFR BLD CALC: 36.3 % (ref 37–47)
LYMPHOCYTES # BLD: 1.4 /CUMM (ref 1.2–3.4)
MCH RBC QN AUTO: 30.7 PG (ref 27–31)
MCHC RBC AUTO-ENTMCNC: 34.4 G/DL (ref 33–37)
MCV RBC AUTO: 89.3 FL (ref 81–99)
MONOCYTES # BLD: 0.8 /CUMM (ref 0.1–0.6)
PLATELET # BLD: 270 /CUMM (ref 130–400)
PMV BLD AUTO: 9.1 FL (ref 7.4–10.4)
RED BLOOD CELL CT: 4.06 /CUMM (ref 4.2–5.4)
WBC # BLD AUTO: 11.6 /CUMM (ref 4.8–10.8)

## 2017-02-14 NOTE — PN- HOUSESTAFF
Subjective
Follow-up For:
COPD exacerbation
Left leg pain, RULE OUT DVT
 
Subjective:
Patient reports much improvement in the breathing, has less coughing, no pain on
the left leg.
 
Review of Systems
Constitutional:
Denies: chills, fever, weakness. 
EENTM:
Reports: no symptoms. 
Cardiovascular:
Reports: no symptoms. 
Respiratory:
Reports: no symptoms. 
Gastrointestinal:
Reports: no symptoms. 
Genitourinary:
Reports: no symptoms. 
Musculoskeletal:
Reports: no symptoms. 
Skin:
Reports: no symptoms. 
Neurological/Psychological:
Reports: no symptoms. 
 
Objective
Last 24 Hrs of Vital Signs/I&O
 Vital Signs
 
 
Date Time Temp Pulse Resp B/P Pulse O2 O2 Flow FiO2
 
     Ox Delivery Rate 
 
 0003 97.8 89 18 110/72 95 Nasal 2.0L 
 
      Cannula  
 
 0000      Nasal 2.5L 
 
      Cannula  
 
 1810 98.4 98 20 111/71 93 Nasal 2.0L 
 
      Cannula  
 
 1425 98.0 87 20 119/79 93 Room Air  
 
 1314     93   
 
 1300     91 Room Air Room Air 
 
 1254 97.0 81 22 112/67 90 Room Air  
 
 1202 96.6 96 20 115/75 94 Room Air  
 
 
 Intake & Output
 
 
  0800  0000  1600
 
Intake Total 260 480 490
 
Output Total   
 
Balance 260 480 490
 
    
 
Intake, IV 20  250
 
Intake, Oral 240 480 240
 
Number 1  
 
Bowel   
 
Movements   
 
Patient  74.843 kg 74.843 kg
 
Weight   
 
 
 
 
Physical Exam
General Appearance: Alert, Oriented X3, Cooperative, No Acute Distress
Skin: No Rashes, No Breakdown, No Significant Lesion
HEENT: Atraumatic, PERRLA, EOMI, Mucous Membr. moist/pink
Neck: Supple
Cardiovascular: Regular Rate, Normal S1, Normal S2, No Murmurs
Lungs: Clear to Auscultation, Normal Air Movement
Abdomen: Normal Bowel Sounds, Soft, No Tenderness
Neurological: Normal Speech, Strength at 5/5 X4 Ext, Normal Tone, Sensation 
Intact, Cranial Nerves 3-12 NL
Extremities: No Clubbing, No Cyanosis, No Edema, Normal Pulses, No Tenderness/
Swelling
 
Last 24 Hrs of Lab/[place] Results
Last 24 Hrs of Labs/Mics:
 Laboratory Tests
 
17 1245:
Anion Gap 12, Estimated GFR > 60, BUN/Creatinine Ratio 21.1, Glucose 94, Calcium
9.8, Magnesium 2.2, Total Bilirubin 0.9, AST 23, ALT 34, Alkaline Phosphatase 53
, Troponin I < 0.01, Total Protein 6.9, Albumin 4.2, Globulin 2.7, Albumin/
Globulin Ratio 1.6, PT 10.6, INR 1.01, APTT 29, CBC w Diff NO MAN DIFF REQ, RBC 
4.37, MCV 88.8, MCH 30.0, RDW 13.1, MPV 8.6, Gran % 57.4, Lymphocytes % 28.5, 
Monocytes % 9.6  H, Eosinophils % 4.2, Basophils % 0.3, Absolute Granulocytes 
4.2, Absolute Lymphocytes 2.1, Absolute Monocytes 0.7  H, Absolute Eosinophils 
0.3, Absolute Basophils 0, PUBS MCHC 33.7
 
 
Assessment/Plan
Assessment:
66-year-old lady with past medical history of COPD on oxygen at night, 
fibromyalgia, diabetic type II came with chief complaint of desaturation for 
about 5 days and her O2 saturation was around 85% on room air and she also 
complained of shortness of breath. Patient reports having minimal cough and 
wheezing today.  Patient denies any fever, sick contacts, chills, nausea, 
vomiting, abdominal pain.  Patient does report of mild left calf pain.
 
She called Dr. Preciado and was advised to come to the ED.  She desaturated to 
87% on ambulation on room air.
 
Vital signs are noted above
Physical exam is notable for bilateral upper mild expiratory wheezing
 
CBC, troponin, BEP were unremarkable
 
EKG NSR HR 81, .
 
CXR 
1. Pulmonary emphysema.
2. No evidence of pneumonia or other acute pulmonary process compared to
2015.
 
Assessment and plan
 
COPD exacerbation
symptoms much improved, nowheezing heard in the exam, no rhonchi
* Keep O2 saturation over 92%
* continue TRC nebs
* continue IV Solu-Medrol 40 mg  every 8 hours
* PO azithromycin 250 mg daily for 5 days
* F/u pulmonary recommendations
 
Left leg pain - resolved
cramps are resolved, no swelling noted. ruled out DVT with doppler ultrasound
 
Hyperlipidemia, anxiety, ,depression, GERD
* Continue home medications
 
Diabetes
* Metformin, sliding scale insulin, fingersticks, diabetic diet
 
DVT Prophylaxis is mechanical and Lovenox, full code, Tylenol for pain, diabetic
diet
Problem List:
 1. COPD exacerbation
 
Pain Ratin
Pain Location:
pain and cramping on the posterior aspect of the left lower leg, resolved
Pain Goal: Pain 4 or less
Pain Plan:
tylenol
Tomorrow's Labs & Rationales:
CBC and BEP

## 2017-02-14 NOTE — PN- ATT ADDEND
Attending MD Review Statement
 
Attending Statement
Attending MD Statement: examined this patient, discuss w/resident/PA/NP, agreed 
w/resident/PA/NP, reviewed EMR data (avail), discussed w/nursing
Attending Assessment/Plan:
 Laboratory Tests
 
 
 
02/14/17 0658:
Anion Gap 10, Estimated GFR > 60, BUN/Creatinine Ratio 24.4, CBC w Diff NO MAN 
DIFF REQ, RBC 4.06  L, MCV 89.3, MCH 30.7, RDW 12.9, MPV 9.1, Gran % 80.5  H, 
Lymphocytes % 12.0  L, Monocytes % 7.2, Eosinophils % 0.1, Basophils % 0.2, 
Absolute Granulocytes 9.4  H, Absolute Lymphocytes 1.4, Absolute Monocytes 0.8  
H, Absolute Eosinophils 0, Absolute Basophils 0, PUBS MCHC 34.4
Vital Signs
 
 
Date Time Temp Pulse Resp B/P Pulse O2 O2 Flow FiO2
 
     Ox Delivery Rate 
 
02/14 1549      Nasal 2.0L 
 
      Cannula  
 
02/14 0825 97.5 84 20 126/80 95 Nasal 2.0L 
 
      Cannula  
 
02/14 0003 97.8 89 18 110/72 95 Nasal 2.0L 
 
      Cannula  
 
02/14 0000      Nasal 2.5L 
 
      Cannula  
 
02/13 1810 98.4 98 20 111/71 93 Nasal 2.0L 
 
      Cannula  
 
 
66-year-old female with past medical history of COPD on nocturnal oxygen at 2 L 
at home who presented with chief complaint of coughing.  Patient over the last 
few days has been using oxygen 24 hours a day at 3 L due to her subjective 
feeling of shortness of breath and decreased oxygen saturation on pulse oximetry
at home.
 
Patient denies any fevers or chills and any sick contacts.  Patient in ER was 
found to have oral to saturations of 88% on room air and also worsening of her 
O2 sats was seen with ambulation.  Patient is being admitted for COPD 
exacerbation.  On exam she was found to have rhonchi bilateral lung fields.  
 
Assessment and plan-
acute COPD exacerbation with hypoxic respiratory failure.  Patient was given IV 
steroids in the ER and chest x-ray done was negative for any infiltrates.  We 
will admit her to medicine.  We'll continue her on steroids and we'll also give 
her Zithromax .
cont on iv steroids and if doing better will switch to po steroids tomorrow. 
d/w pt and pulmonology the care plan.

## 2017-02-14 NOTE — PN- PULMONARY
LUCILA WALTER,[facility] 02/14/17 1149:
Subjective
HPI/Critical Care Issues:
Follow-up for: COPD exacerbation
 
 
Patient was seen and examined today, she is resting comfortably on her bed with 
nasal cannula 2 L oxygen and saturation 93%.  She started to complain of 
nonproductive cough yesterday, denied fever, chills, chest pain or tightness, 
nasal congestion, headache.
Patient reported chest pain when she first started to complain of shortness of 
breath 5 days ago.  She is on nocturnal 2 L oxygen, for the past few days she's 
been using oxygen 3 and then 2.5 L during the day for comfort as she noticed 
shortness of breath during the day and the oxygen desaturation. 
Patient denied terminal pain, urinary symptoms dysuria or hematuria or increased
frequency.
 
Objective
Current Medications:
 Current Medications
 
 
  Sig/Bruna Start time  Last
 
Medication Dose Route Stop Time Status Admin
 
Acetaminophen 650 MG Q6P PRN 02/13 1745 AC 
 
  PO   
 
Albuterol Sulfate 3 ML EVERY 4 HRS/AWAKE 02/14 1200 AC 02/14
 
  INH   1222
 
Amitriptyline HCl 25 MG QPM 02/13 2200 AC 02/13
 
  PO   2202
 
Azithromycin 250 MG DAILY 02/14 1000 AC 02/14
 
  PO   0918
 
Azithromycin 500 MG DAILY 02/13 1743 DC 
 
Dextrose/Water 250 ML IV   
 
Azithromycin 500 MG ONCE ONE 02/13 1445 DC 02/13
 
Dextrose/Water 250 ML IV 02/13 1544  1524
 
Budesonide/ 2 PUF BID 02/13 2200 AC 02/14
 
Formoterol Fumarate  INH   0914
 
Cholecalciferol 1,000 IU DAILY 02/14 1000 AC 02/14
 
  PO   0909
 
Citalopram  40 MG QPM 02/13 2200 AC 02/13
 
Hydrobromide  PO   2202
 
Clonazepam 0.5 MG DAILY AS NEEDED 02/13 1745 AC 
 
  PO 02/20 1744  
 
Cyanocobalamin 1,000 MCG DAILY 02/14 1000 AC 02/14
 
  PO   0909
 
Enoxaparin Sodium 40 MG DAILY 02/14 1000 AC 02/14
 
  SC   0913
 
Fish Oil 1,050 MG DAILY 02/14 1000 AC 02/14
 
  PO   0910
 
Fish Oil 1,200 MG QPM 02/13 2200 CAN 
 
  PO   
 
Insulin Aspart 0 TIDAC 02/14 0800 AC 02/14
 
  SC   1251
 
Melatonin 3 MG AT BEDTIME 02/14 2200 DC 
 
  PO   
 
Melatonin 3 MG AT BEDTIME 02/13 1945 AC 02/13
 
  PO   2202
 
Methylprednisolone 40 MG Q8 02/14 0600 AC 02/14
 
  IV   0542
 
Omeprazole 40 MG DAILY AC 02/14 0700 AC 02/14
 
  PO   0542
 
Tiotropium [place] 1 PUF DAILY 02/14 1000 AC 02/14
 
  INH   0914
 
 
 
 
Vital Signs & I&O
Last 24 Hrs of Vitals and I&O:
 Vital Signs
 
 
Date Time Temp Pulse Resp B/P Pulse O2 O2 Flow FiO2
 
     Ox Delivery Rate 
 
02/14 0825 97.5 84 20 126/80 95 Nasal 2.0L 
 
      Cannula  
 
02/14 0003 97.8 89 18 110/72 95 Nasal 2.0L 
 
      Cannula  
 
02/14 0000      Nasal 2.5L 
 
      Cannula  
 
02/13 1810 98.4 98 20 111/71 93 Nasal 2.0L 
 
      Cannula  
 
 
 Intake & Output
 
 
 02/14 1600 02/14 0800 02/14 0000
 
Intake Total  260 480
 
Output Total   
 
Balance  260 480
 
    
 
Intake, IV  20 
 
Intake, Oral  240 480
 
Number  1 
 
Bowel   
 
Movements   
 
Patient   74.843 kg
 
Weight   
 
 
 
 
Exam
General Appearance: well developed/nourished, no apparent distress, alert, awake
Head: atraumatic, normal appearance
Ears, Nose, Throat: normal ENT inspection
Respiratory: normal breath sounds, chest non-tender, no respiratory distress, 
lungs clear
Cardiovascular: regular rate/rhythm
Abdomen: normal bowel sounds, soft, non-tender
Back: normal inspection
Extremities: normal inspection, normal capillary refill, normal range of motion,
no edema, No calf tenderness
 
Impression/Plan
 
Impression/Plan
Impression/Plan:
Ms. Astudillo is 66 year old female with past medical history significant for COPD
on nocturnal 2 L oxygen, fibromyalgia, osteoarthritis and type 2 diabetes 
mellitus who presented on 2/13/17 with chief complaint of shortness of breath 
and oxygen desaturation for 5 days.
 
CXR
1. Pulmonary emphysema.
2. No evidence of pneumonia or other acute pulmonary process compared to 11/18/
2015.
 
Bilateral venous Doppler
Normal triplex scan without evidence of deep venous thrombosis involving the
bilateral lower extremities.
 
Problem list:
1.  Acute exacerbation of COPD.
2.  Hypoxia related to acute exacerbation of COPD.
3.  History of diabetes.
4.  Nocturnal hypoxemia.
 
Recommendations:
* Solu-Medrol 40 mg IV every 8 hours, switch to every 12 tomorrow
* Azithromycin 250 mg orally daily for 5 daysn Day#2
* Attempt to wean off the oxygen, if the patient continues to desaturate 
consider CTA
* Patient saturating well on 2 L oxygen, rest of vital signs are stable, 
afaibrile, no tachycardia or hypotension, new onset of leukocytosis 11.6 mostly 
related to steroids
* Patient has no risk factors for PE, no recent surgery, hospitalization, long 
trip. WELLS score zero 
* Bilateral venous Doppler scan negative for DVT
* Continue TRC
* DVT prophylaxis martha GARCIA MD,ANGELICA LUZ 02/14/17 1359:
Impression/Plan
 
Impression/Plan
Recommendations:
Addendum:
I have personally seen and examined the patient and agree with the resident's 
assessment and plan as above.  We will continue her on all of her current 
therapy including IV steroids or azithromycin and nebs/TRC.  If the patient 
continues to improve, we will consider discharge to home tomorrow.

## 2017-02-15 VITALS — SYSTOLIC BLOOD PRESSURE: 100 MMHG | DIASTOLIC BLOOD PRESSURE: 52 MMHG

## 2017-02-15 LAB
ABSOLUTE GRANULOCYTE CT: 11.6 /CUMM (ref 1.4–6.5)
BASOPHILS # BLD: 0 /CUMM (ref 0–0.2)
BASOPHILS NFR BLD: 0.2 % (ref 0–2)
EOSINOPHIL # BLD: 0 /CUMM (ref 0–0.7)
EOSINOPHIL NFR BLD: 0.1 % (ref 0–5)
ERYTHROCYTE [DISTWIDTH] IN BLOOD BY AUTOMATED COUNT: 13.3 % (ref 11.5–14.5)
GRANULOCYTES NFR BLD: 82.4 % (ref 42.2–75.2)
HCT VFR BLD CALC: 36.5 % (ref 37–47)
LYMPHOCYTES # BLD: 1.6 /CUMM (ref 1.2–3.4)
MCH RBC QN AUTO: 30.1 PG (ref 27–31)
MCHC RBC AUTO-ENTMCNC: 33.6 G/DL (ref 33–37)
MCV RBC AUTO: 89.7 FL (ref 81–99)
MONOCYTES # BLD: 0.9 /CUMM (ref 0.1–0.6)
PLATELET # BLD: 298 /CUMM (ref 130–400)
PMV BLD AUTO: 8.9 FL (ref 7.4–10.4)
RED BLOOD CELL CT: 4.07 /CUMM (ref 4.2–5.4)
WBC # BLD AUTO: 14.1 /CUMM (ref 4.8–10.8)

## 2017-02-15 NOTE — PATIENT DISCHARGE INSTRUCTIONS
Discharge Instructions
 
General Discharge Information
You were seen/treated for:
COPD exacerbation
Special Instructions:
Please:
follow up with Dr. Preciado, pulmonary within 1-2 weeks of discharge
follow up with your PCP within 1-2 weeks of discharge
 
Diet
Recommended Diet: Diabetic
 
Activity
Activity Self Limited: Yes
 
Acute Coronary Syndrome
 
Inclusion Criteria
At DC or during hospital stay patient has or had the following:
ACS DIAGNOSIS No
 
Discharge Core Measures
Meds if any: Prescribed or Continued at Discharge
Meds if any: NOT Prescribed or Continued at Discharge
 
Congestive Heart Failure
 
Inclusion Criteria
At DC or during hospital stay patient has or had the following:
CHF DIAGNOSIS No
 
Discharge Core Measures
Meds if any: Prescribed or Continued at Discharge
Meds if any: NOT Prescribed or Continued at Discharge
 
Cerebrovascular accident
 
Inclusion Criteria
At DC or during hospital stay patient has or had the following:
CVA/TIA Diagnosis No
 
Discharge Core Measures
Meds if any: Prescribed or Continued at Discharge
Meds if any: NOT Prescribed or Continued at Discharge
 
Venous thromboembolism
 
Inclusion Criteria
VTE Diagnosis No
VTE Type NONE
VTE Confirmed by (Test) NONE
 
Discharge Core Measures
- Per Current guidelines, there needs to be overlap
- treatment for the first 5 days of Warfarin therapy.
- If discharged on Warfarin prior to 5 days of
- overlap therapy, the patient will need to be
- assessed for post discharge needs including
- *Post discharge parental anticoagulation
- *Warfarin and/or parental anticoagulation education
- *Follow up date to check INR post discharge
At least 5 days overlap therapy as Inpatient No
Meds if any: Prescribed or Continued at Discharge
Note: Overlap Therapy is Warfarin and Anticoagulant
Meds if any: NOT Prescribed or Continued at Discharge

## 2017-02-15 NOTE — DISCHARGE SUMMARY
Visit Information
 
Visit Dates
Admission Date:
02/13/17
 
Discharge Date:
02/15/17
 
 
Hospital Course
 
Course
Attending Physician:
MICHELLE WALTER,LUZ ELENA CORONADO
 
Primary Care Physician:
SCOTT ZAMAN MD
 
Hospital Course:
Ms Astudillo is a 66-year-old lady with past medical history of COPD on oxygen at 
night, fibromyalgia, diabetic type II who came with chief complaint of 
desaturation for about 5 days. Her O2 saturation was around 85% on room air and 
she also complained of shortness of breath, coughing and wheezing. She called 
Dr. Preciado and was advised to come to the ED. She was admitted to the [facility] floor
for COPD exacerbation and the following were addressed during her stay:
 
COPD exacerbation
Patient was noted to have bilateral end expiratory wheezing. CXR showed 
pulmonary emphysema and no evidence of pneumonia or other acute pulmonary 
process. She was kept on O2 per NC and received nebulizer treatment, as well as 
IV solumedrol and azithromycin. Pulmonary Dr. Preciado visited the patient and 
gave us recommendations. She clinically improved significantly on the second day
, was switched to PO azithromycin to complete 5 days of treatment. Also steroid 
was switched to prednisone on the third day and patient was discharged on the 
taper: 40 mg x2 days, 30 mg x2days, 20 mg x2days, 10 mg x2 days and then stop. 
Patient was saturating >92% without oxygen at rest and 90% on ambulation, she 
has oxygen at home and will use O2 as needed during activities and continuously 
at bed time. She will follow up with Dr. Preciado and her PCP within 1-2 weeks 
of discharge.
 
Left leg pain 
Patient reported left lower extremity pain and cramping on admission which 
resolved on its own on the next day, we did doppler US and ruled out DVT.
 
Hx of Hyperlipidemia, anxiety, depression, GERD
Continued home medications
 
Diabetes
Patient received insulin novolog sliding scale during stay. Restarted her on 
metformin at discharge.
 
Allergies:
Coded Allergies:
ampicillin (UNKNOWN 04/12/16)
atorvastatin (UNKNOWN 04/12/16)
ciprofloxacin (From CIPRO) (UNKNOWN 04/12/16)
dicyclomine (UNKNOWN 04/12/16)
estradiol (UNKNOWN 04/12/16)
simvastatin (UNKNOWN 04/12/16)
sulfamethoxazole (From SEPTRA) (DIARRHEA 04/12/16)
trimethoprim (From SEPTRA) (DIARRHEA 04/12/16)
 
Significant Procedures:
SERVICE DATE: 02/13/
EXAM TYPE: RAD - XRY-CHEST XRAY, PA AND LATERAL
 
EXAMINATION:
XR CHEST
 
CLINICAL INFORMATION:
Cough and shortness of breath
 
COMPARISON:
CXR from 11/18/2015 and chest CT from 08/18/2016
 
TECHNIQUE:
2 views of the chest were obtained.
 
FINDINGS:
There is pulmonary emphysema associated with enhanced, but stable reticular
markings in both lungs. No acute pulmonary consolidation, interstitial edema,
hilar lymphadenopathy or pleural effusion. There is surgical change from
wedge resection from the medial right apex. Cardiac silhouette remains normal
in size. There is atherosclerotic calcification of the aortic arch. No acute
findings within the mildly degenerated thoracic spine.
 
IMPRESSION:
1. Pulmonary emphysema.
2. No evidence of pneumonia or other acute pulmonary process compared to
11/18/2015.
 
DICTATED BY: BAO ROCK MD 
DATE/TIME DICTATED:02/13/17 / 1317
:MXAX 
DATE/TIME TRANSCRIBED:02/13/17 / 1317
 
SERVICE DATE: 02/13/17-
EXAM TYPE: US - US-EXT BILAT VENOUS DOPPLER
 
EXAMINATION:
US TRIPLEX LOWER EXTREMITY, BILATERAL
 
CLINICAL INFORMATION:
Cramping.
 
COMPARISON:
None
 
TECHNIQUE:
Color-flow triplex imaging with spectral analysis and compression Doppler
were performed on the bilateral lower extremities.
 
FINDINGS:
Respiratory variation, normal compression and augmented flow are noted
throughout the bilateral lower extremities. The visualized common femoral
vein, superficial femoral vein, profunda femoral vein, popliteal vein and
midcalf peroneal and posterior tibial venous segments show no evidence of
deep venous thrombosis.
 
There is no Baker's cyst.
 
IMPRESSION:
Normal triplex scan without evidence of deep venous thrombosis involving the
bilateral lower extremities.
 
DICTATED BY: SUMAN TRUJILLO MD 
DATE/TIME DICTATED:02/13/17 / 2229
:RAD.WALKER 
DATE/TIME TRANSCRIBED:02/13/17 / 2229
Pertinent Lab Results:
02/13/17 1245:
Anion Gap 12, Estimated GFR > 60, BUN/Creatinine Ratio 21.1, Glucose 94, Calcium
9.8, Magnesium 2.2, Total Bilirubin 0.9, AST 23, ALT 34, Alkaline Phosphatase 53
, Troponin I < 0.01, Total Protein 6.9, Albumin 4.2, Globulin 2.7, Albumin/
Globulin Ratio 1.6, PT 10.6, INR 1.01, APTT 29, CBC w Diff NO MAN DIFF REQ, RBC 
4.37, MCV 88.8, MCH 30.0, RDW 13.1, MPV 8.6, Gran % 57.4, Lymphocytes % 28.5, 
Monocytes % 9.6  H, Eosinophils % 4.2, Basophils % 0.3, Absolute Granulocytes 
4.2, Absolute Lymphocytes 2.1, Absolute Monocytes 0.7  H, Absolute Eosinophils 
0.3, Absolute Basophils 0, PUBS MCHC 33.7
 
 
Disposition Summary
 
Disposition
Principal Diagnosis:
COPD exacerbation
Additional Diagnosis:
History of DM, HTN, HLD, fibromyalgia, anxiety, depression, GERD
Discharge Disposition: home or self care
 
Discharge Instructions
 
General Discharge Information
Code Status: Full Code
Patient's Diet:
diabetic diet
Patient's Activity:
as tolerated
Follow-Up Instructions/Appts:
Please:
follow up with Dr. Preciado, pulmonary within 1-2 weeks of discharge
follow up with your PCP within 1-2 weeks of discharge
 
Medications at Discharge
Discharge Medications:
Continue taking these medications:
Pantoprazole Sodium (Pantoprazole Sodium) 40 MG TABLET.
    1 Tablet ORAL DAILY
    Qty = 90
    Comments:
       PRILOSEC GIVEN WHILE HOSPITALIZED 2/15/17 AT 7:00 AM
 
Fluticasone/Salmeterol (Advair 250-50 Diskus) 250 MCG-50 MCG/DOSE BLST.W.DEV
    1 Puff Inhale through mouth TWICE DAILY
    Qty = 180
    Comments:
       NOT GIVEN WHILE HOSPITALIZED,
       SYMBICORT GIVEN 2/15/17 AT 10:00 AM
 
Citalopram Hydrobromide (Citalopram HBr) 40 MG TABLET
    1 Tablet ORAL DAILY
    Qty = 90
    Comments:
       LAST DOSE GIVEN 2/14/17 AT 10:00 PM
 
Metformin HCl (Metformin HCl ER) 500 MG TAB.ER.24H
    1 Tablet ORAL SEE INSTRUCTIONS
    Qty = 270
    Instructions:
       1 TAB MORNING 2 TAB EVENING
    Comments:
       NOT GIVEN WHILE HOSITALIZED
 
Rosuvastatin Calcium (Crestor) 10 MG TABLET
    1 Tablet ORAL DAILY
    Qty = 90
    Comments:
       NOT GIVEN WHILE HOSPITALIZED
 
Tiotropium Bromide (Spiriva) 18 MCG CAP.W.DEV
    1 Capsule Inhale through mouth DAILY
    Qty = 90
    Comments:
       LAST DOSE GIVEN 2/15/17 AT 10:00 AM
 
Amitriptyline HCl (Amitriptyline HCl) 25 MG TABLET
    1 Tablet ORAL Every night
    Qty = 90
    Comments:
       LAST DOSE GIVEN 2/14/17 AT 10:00 PM
 
Albuterol Sulfate (Ventolin Hfa) 90 MCG HFA.AER.AD
    2 Puff Inhale through mouth EVERY 4-6 HOURS AS NEEDED as needed for 
BREATHING PROBLEMS
    Qty = 54
    Comments:
       NOT GIVEN WHILE HOSPITALIZED
 
Fish Oil/Borage/Flax/Om3,6,9#1 (Omega 3-6-9 1,200 MG Softgel) 1,200 MG CAPSULE
    1 Capsule ORAL DAILY
    Comments:
       LAST DOSE GIVEN 2/15/17 AT 10:00 AM
 
Cyanocobalamin (Vitamin B-12) 1,000 MCG TABLET
    1 Tablet ORAL DAILY
    Comments:
       LAST DOSE GIVEN 2/15/17 AT 10:00 AM
 
Cholecalciferol (Vitamin D3) (Vitamin D) 2,000 UNIT TABLET
    4,000 Unit ORAL DAILY
    Comments:
       LAST DOSE GIVEN 2/15/17 AT 10:00 AM
 
Melatonin (Melatin) 3 MG TABLET
    3 Milligram ORAL DAILY
    Comments:
       LAST DOSE GIVEN 2/14/17 AT 10:00 PM
 
Clonazepam (Clonazepam) 0.5 MG TABLET
    1 Tablet ORAL DAILY AS NEEDED
    Comments:
       NOT GIVEN WHILE HOSPITALIZED
 
Start taking the following new medications:
Prednisone (Prednisone) 10 MG TABLET
    1 Tablet OTHER See Instructions
    Qty = 16
    No Refills
    Instructions:
       Please:
       take 4 tabs on 2/16/17 then
       take 3 tabs on 2/17/17 and 2/18/17 then
       take 2 tabs on 2/19/17 and 2/20/17 then
       take 1 tab on 2/21/17 and 2/22/17  then
        
       stop.
    Comments:
       40 MG GIVEN 2/15/17 AT 1:00 PM
 
Albuterol Sulfate (Albuterol Sulfate) 1.25 MG/3 ML VIAL.NEB
    1 Vial Inhale Solution EVERY 4-6 HOURS as needed for COPD
    Qty = 150
    No Refills
 
Azithromycin (Azithromycin) 250 MG TABLET
    1 Tablet ORAL DAILY
    Qty = 2
    No Refills
    Instructions:
       PLEASE TAKE 1 TAB ON 2/16/17 AND ONE TAB ON 2/18/17.
    Comments:
       LAST DOSE GIVEN 2/15/17 AT 10:00 AM
 
 
Copies To:
JOSE WALTER,ANGELICA ESCOBAR; AUSTYN WALTER,SCOTT GRACIA
 
Attending MD Review Statement
Documenting Attending:
MICHELLE WALTER,LUZ ELENA CORONADO
Other Findings:
agree with the above discharge plan.

## 2017-02-15 NOTE — PN- PULMONARY
Subjective
HPI/Critical Care Issues:
Follow-up for: COPD exacerbation
 
Patient was seen and examined this morning, oxygen was titrated from 2-1 L with 
good saturation above 92%.  Patient denied any cough, fever, chills, chest pain,
palpitation.
Vital signs are stable, no overnight events reported by the patient or the 
nurse.
 
Objective
Current Medications:
 Current Medications
 
 
  Sig/Bruna Start time  Last
 
Medication Dose Route Stop Time Status Admin
 
Acetaminophen 650 MG Q6P PRN 02/13 1745 AC 
 
  PO   
 
Albuterol Sulfate 3 ML EVERY 4 HRS/AWAKE 02/14 1200 AC 02/15
 
  INH   0932
 
Amitriptyline HCl 25 MG QPM 02/13 2200 AC 02/14
 
  PO   2114
 
Azithromycin 250 MG DAILY 02/14 1000 AC 02/15
 
  PO   0848
 
Budesonide/ 2 PUF BID 02/13 2200 AC 02/15
 
Formoterol Fumarate  INH   0846
 
Cholecalciferol 1,000 IU DAILY 02/14 1000 AC 02/15
 
  PO   0847
 
Citalopram  40 MG QPM 02/13 2200 AC 02/14
 
Hydrobromide  PO   2114
 
Clonazepam 0.5 MG DAILY AS NEEDED 02/13 1745 AC 
 
  PO 02/20 1744  
 
Cyanocobalamin 1,000 MCG DAILY 02/14 1000 AC 02/15
 
  PO   0847
 
Enoxaparin Sodium 40 MG DAILY 02/14 1000 AC 02/15
 
  SC   0846
 
Fish Oil 1,050 MG DAILY 02/14 1000 AC 02/15
 
  PO   0847
 
Insulin Aspart 0 TIDAC 02/14 0800 AC 02/15
 
  SC   0846
 
Melatonin 3 MG AT BEDTIME 02/13 1945 AC 02/14
 
  PO   2114
 
Methylprednisolone 40 MG Q8 02/14 0600 AC 02/15
 
  IV   0611
 
Omeprazole 40 MG DAILY AC 02/14 0700 AC 02/15
 
  PO   0611
 
Polyethylene Glycol 17 GM AT BEDTIME 02/14 2200 AC 02/15
 
  PO   0611
 
Tiotropium [place] 1 PUF DAILY 02/14 1000 AC 02/15
 
  INH   0846
 
 
 
 
Vital Signs & I&O
Last 24 Hrs of Vitals and I&O:
 Vital Signs
 
 
Date Time Temp Pulse Resp B/P Pulse O2 O2 Flow FiO2
 
     Ox Delivery Rate 
 
02/15 0936     93 Room Air  
 
02/15 0852 97.4 73 18 100/52 97 Nasal 2.0L 
 
      Cannula  
 
02/15 0800     96 Nasal 1.0L 
 
      Cannula  
 
02/15 0000     96 Nasal 1.0L 
 
      Cannula  
 
02/14 2340 98.6 80 20 90/50 94 Nasal 2.0L 
 
      Cannula  
 
02/14 2046     94 Nasal 2.0L 
 
      Cannula  
 
02/14 1702     94 Nasal 2.0L 
 
      Cannula  
 
02/14 1558 98.5 84 19 110/60 96 Nasal 2.0L 
 
      Cannula  
 
02/14 1549      Nasal 2.0L 
 
      Cannula  
 
 
 Intake & Output
 
 
 02/15 1600 02/15 0800 02/15 0000
 
Intake Total  240 300
 
Output Total   
 
Balance  240 300
 
    
 
Intake, Oral  240 300
 
 
 
 
Exam
General Appearance: well developed/nourished, no apparent distress, alert, awake
Head: atraumatic, normal appearance
Ears, Nose, Throat: normal ENT inspection
Neck: normal inspection, supple, full range of motion
Respiratory: normal breath sounds, chest non-tender, no respiratory distress
Cardiovascular: regular rate/rhythm
Abdomen: normal bowel sounds, soft, non-tender
Extremities: normal inspection, normal capillary refill, normal range of motion,
no edema
 
Impression/Plan
 
Impression/Plan
Impression/Plan:
Ms. Astudillo is 66 year old female with past medical history significant for COPD
on nocturnal 2 L oxygen, fibromyalgia, osteoarthritis and type 2 diabetes 
mellitus who presented on 2/13/17 with chief complaint of shortness of breath 
and oxygen desaturation for 5 days.
 
CXR
1. Pulmonary emphysema.
2. No evidence of pneumonia or other acute pulmonary process compared to 11/18/
2015.
 
Bilateral venous Doppler
Normal triplex scan without evidence of deep venous thrombosis involving the
bilateral lower extremities.
 
Problem list:
1.  Acute exacerbation of COPD.
2.  Hypoxia related to acute exacerbation of COPD.
3.  History of diabetes.
4.  Nocturnal hypoxemia.
 
Recommendations:
* Patient can be switched to prednisone 40daily x2, 302, 202, 102 and then 
stop
* Continue TRC and nebulizers
* Azithromycin 250 mg orally daily for 5 daysn Day#3
* Attempt to wean off the oxygen, keep oxygen saturation above 92%
* Patient is stable for discharge today
* DVT prophylaxis lovenox

## 2017-02-15 NOTE — PN- HOUSESTAFF
**See Addendum**
Subjective
Follow-up For:
COPD exacerbation
Subjective:
Patient is comfortably sittng in bed, appears in no distress, does not reports 
SOB. No pain/cramping on the left leg.
 
Review of Systems
Constitutional:
Denies: chills, fever. 
EENTM:
Reports: no symptoms. 
Cardiovascular:
Denies: chest pain, palpitations. 
Respiratory:
Reports: cough (improved compared to yesterday).  Denies: short of breath, 
sputum production. 
Gastrointestinal:
Denies: abdominal pain, changes in stool. 
Genitourinary:
Reports: no symptoms. 
Musculoskeletal:
Reports: no symptoms. 
 
Objective
Last 24 Hrs of Vital Signs/I&O
 Vital Signs
 
 
Date Time Temp Pulse Resp B/P Pulse O2 O2 Flow FiO2
 
     Ox Delivery Rate 
 
02/15 0936     93 Room Air  
 
02/15 0852 97.4 73 18 100/52 97 Nasal 2.0L 
 
      Cannula  
 
02/15 0800     96 Nasal 1.0L 
 
      Cannula  
 
02/15 0000     96 Nasal 1.0L 
 
      Cannula  
 
 2340 98.6 80 20 90/50 94 Nasal 2.0L 
 
      Cannula  
 
 2046     94 Nasal 2.0L 
 
      Cannula  
 
 1702     94 Nasal 2.0L 
 
      Cannula  
 
 1558 98.5 84 19 110/60 96 Nasal 2.0L 
 
      Cannula  
 
 1549      Nasal 2.0L 
 
      Cannula  
 
 
 Intake & Output
 
 
 02/15 1600 02/15 0800 02/15 0000
 
Intake Total  240 300
 
Output Total   
 
Balance  240 300
 
    
 
Intake, Oral  240 300
 
 
 
 
Physical Exam
General Appearance: Alert, Oriented X3, Cooperative, No Acute Distress
Skin: No Rashes, No Breakdown, No Significant Lesion
HEENT: Atraumatic
Neck: Supple
Cardiovascular: Regular Rate, Normal S1, Normal S2
Lungs: Normal Air Movement
Abdomen: Normal Bowel Sounds, Soft, No Tenderness
Neurological: Normal Speech, Strength at 5/5 X4 Ext, Normal Tone, Sensation 
Intact
Extremities: No Cyanosis, No Edema, Normal Pulses
Vascular: Normal Pulses, Pulses Symmetrical
 
Assessment/Plan
Assessment:
66-year-old lady with past medical history of COPD on oxygen at night, 
fibromyalgia, diabetic type II came with chief complaint of desaturation for 
about 5 days and her O2 saturation was around 85% on room air and she also 
complained of shortness of breath. Patient reports having minimal cough and 
wheezing today.  Patient denies any fever, sick contacts, chills, nausea, 
vomiting, abdominal pain.  Patient does report of mild left calf pain.
 
She called Dr. Preciado and was advised to come to the ED.  She desaturated to 
87% on ambulation on room air.
 
Vital signs are noted above
Physical exam is notable for bilateral upper mild expiratory wheezing
 
CBC, troponin, BEP were unremarkable
 
EKG NSR HR 81, .
 
CXR 
1. Pulmonary emphysema.
2. No evidence of pneumonia or other acute pulmonary process compared to
2015.
 
Assessment and plan
 
COPD exacerbation
symptoms much improved, no wheezing heard in the exam, no rhonchi
* Keep O2 saturation over 92%
* continue TRC nebs
* stopped IV solumedrol, started on prednisone taper
* PO azithromycin 250 mg daily for 5 days (day 3 today)
* F/u pulmonary recommendations
 
Left leg pain - resolved
cramps are resolved, no swelling noted. ruled out DVT with doppler ultrasound
 
Hyperlipidemia, anxiety, ,depression, GERD
* Continue home medications
 
Diabetes
* Metformin, sliding scale insulin, fingersticks, diabetic diet
 
DVT Prophylaxis is mechanical and Lovenox, full code, Tylenol for pain, diabetic
diet
Problem List:
 1. COPD exacerbation
 
Pain Ratin
Pain Location:
none
Pain Goal: Pain 4 or less
Pain Plan:
mild pain pathway
Tomorrow's Labs & Rationales:
patient is discharged

## 2018-01-08 ENCOUNTER — HOSPITAL ENCOUNTER (INPATIENT)
Dept: HOSPITAL 68 - ERH | Age: 68
LOS: 2 days | DRG: 189 | End: 2018-01-10
Admitting: INTERNAL MEDICINE
Payer: COMMERCIAL

## 2018-01-08 VITALS — HEIGHT: 63 IN | BODY MASS INDEX: 30.12 KG/M2 | WEIGHT: 170 LBS

## 2018-01-08 VITALS — SYSTOLIC BLOOD PRESSURE: 110 MMHG | DIASTOLIC BLOOD PRESSURE: 64 MMHG

## 2018-01-08 DIAGNOSIS — J44.1: ICD-10-CM

## 2018-01-08 DIAGNOSIS — M79.7: ICD-10-CM

## 2018-01-08 DIAGNOSIS — J96.21: Primary | ICD-10-CM

## 2018-01-08 DIAGNOSIS — J44.0: ICD-10-CM

## 2018-01-08 DIAGNOSIS — F32.9: ICD-10-CM

## 2018-01-08 DIAGNOSIS — J20.9: ICD-10-CM

## 2018-01-08 DIAGNOSIS — F41.9: ICD-10-CM

## 2018-01-08 DIAGNOSIS — Z79.84: ICD-10-CM

## 2018-01-08 DIAGNOSIS — I10: ICD-10-CM

## 2018-01-08 DIAGNOSIS — E11.8: ICD-10-CM

## 2018-01-08 DIAGNOSIS — M19.90: ICD-10-CM

## 2018-01-08 DIAGNOSIS — Z99.81: ICD-10-CM

## 2018-01-08 LAB
ABSOLUTE GRANULOCYTE CT: 6.4 /CUMM (ref 1.4–6.5)
BASOPHILS # BLD: 0 /CUMM (ref 0–0.2)
BASOPHILS NFR BLD: 0.1 % (ref 0–2)
EOSINOPHIL # BLD: 0 /CUMM (ref 0–0.7)
EOSINOPHIL NFR BLD: 0.6 % (ref 0–5)
ERYTHROCYTE [DISTWIDTH] IN BLOOD BY AUTOMATED COUNT: 14.5 % (ref 11.5–14.5)
GRANULOCYTES NFR BLD: 78.9 % (ref 42.2–75.2)
HCT VFR BLD CALC: 35.1 % (ref 37–47)
LYMPHOCYTES # BLD: 1.2 /CUMM (ref 1.2–3.4)
MCH RBC QN AUTO: 29 PG (ref 27–31)
MCHC RBC AUTO-ENTMCNC: 34.1 G/DL (ref 33–37)
MCV RBC AUTO: 85.1 FL (ref 81–99)
MONOCYTES # BLD: 0.4 /CUMM (ref 0.1–0.6)
PLATELET # BLD: 272 /CUMM (ref 130–400)
PMV BLD AUTO: 9 FL (ref 7.4–10.4)
PROTHROMBIN TIME: 10.4 SEC (ref 9.4–12.5)
RED BLOOD CELL CT: 4.13 /CUMM (ref 4.2–5.4)
WBC # BLD AUTO: 8.1 /CUMM (ref 4.8–10.8)

## 2018-01-08 PROCEDURE — 2NBSP: CPT

## 2018-01-08 NOTE — CT SCAN REPORT
EXAMINATION:
CT CHEST WITHOUT CONTRAST
 
CLINICAL INFORMATION:
Shortness of breath
 
COMPARISON:
CT 07/17/2017
 
TECHNIQUE:
Multidetector volumetric CT imaging of the chest was done. Axial MIP volume
rendering provided. Sagittal and coronal reformatted images were obtained.
 
DLP:
233 mGy-cm
 
FINDINGS:
 
: Clear lungs
 
LUNGS: Severe, predominantly upper lobe centrilobular emphysema is
redemonstrated. The distribution is similar compared to the recent CT from
07/17/2017. No new consolidation. No pleural effusion. Airways appear patent
to the subsegmental level.
 
A left upper lobe spiculated pulmonary nodule measures 0.7 x 0.7 (image 160,
series 5). This finding is stable dating to 10/23/2015. No new pulmonary
nodules.
 
MEDIASTINUM: The heart is normal in size. No pericardial effusion. No
pathologically enlarged lymph nodes.
 
PLEURA: There is no pleural effusion. No pleural mass or thickening.
 
AXILLA: No lymphadenopathy.
 
UPPER ABDOMEN: Unremarkable.
 
OSSEOUS STRUCTURES: Unremarkable.
 
IMPRESSION:
 
1. Severe emphysema, stable compared to the 07/17/2017 chest CT. No
superimposed acute changes.
 
 
2. Stable left upper lobe pulmonary nodule dating to 10/23/2015.
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
## 2018-01-08 NOTE — ADMISSION CERTIFICATION
Admission Certification
 
Certification Statement
- As attending physician, I certify that at the time of
- admission, based on clinical presentation, severity of
- symptoms, need for further diagnostic testing and
- therapeutic interventions, and risk of adverse outcomes
- without in-hospital treatment, in my clinical assessment,
- this patient requires an acute hospital stay for a minimum
- of two nights or longer. I have also considered psychsocial
- factors such as support system, advanced age, financial
- issues, cognitive issues, and failed out-patient treatments,
- past re-admission history, safety of patient, and lack of
- compliance as applicable.
Specific rationale supporting this admission is:
The patient presents with COPD exacerbation/bronchitis not responding to 
outpatient therapy with acute on chronic hypoxic respiratory failure. Needs 
admission for IV steroids/Abx/aerosol.

## 2018-01-08 NOTE — RADIOLOGY REPORT
EXAMINATION:
XR CHEST
 
CLINICAL INFORMATION:
Shortness of breath
 
COMPARISON:
Screening chest CT from 07/17/2017. Chest x-ray from 02/28/2017
 
TECHNIQUE:
2 views of the chest were obtained.
 
FINDINGS:
There are postsurgical changes along the right lateral mediastinal contour,
stable. There is underlying emphysema with increased upper lobe lucency. In
comparison to the prior studies there is perceived faint increased prominence
of the interstitial markings in the mid to lower lungs. This may reflect an
interstitial pulmonary edema versus an atypical infectious/inflammatory
process. No pleural effusion or pneumothorax is visualized. The
cardiomediastinal silhouette is within normal limits apart from some
tortuosity of the thoracic aorta. The spine demonstrate some minor
degenerative changes. No acute osseous abnormalities are visualized.
 
IMPRESSION:
Slight increase in the interstitial markings in the mid to lower lungs
bilaterally superimposed on underlying emphysema and right upper lobe
postsurgical change. This may reflect an interstitial pulmonary edema or an
atypical infectious/inflammatory process.

## 2018-01-08 NOTE — ED DYSPNEA/ASTHMA COMPLAINT
History of Present Illness
 
General
Chief Complaint: Dyspnea (COPD, CHF, Other)
Stated Complaint: SIB DR MARTÍNEZ FOR LOW O2 SAT/ SOB
Source: patient, family, old records
Exam Limitations: no limitations
 
Vital Signs & Intake/Output
Vital Signs & Intake/Output
 Vital Signs
 
 
Date Time Temp Pulse Resp B/P B/P Pulse O2 O2 Flow FiO2
 
     Mean Ox Delivery Rate 
 
 1644      93 Nasal 3.0L 
 
       Cannula  
 
 1618 97.8 83 18 122/64  94 Nasal 3.0L 
 
       Cannula  
 
 1613      95 Nasal 2.5L 
 
       Cannula  
 
 1308 97.8 90 18 108/69  94 Nasal 2.0L 
 
       Cannula  
 
 
 
Allergies
Coded Allergies:
ampicillin (UNKNOWN 16)
atorvastatin (UNKNOWN 16)
ciprofloxacin (From CIPRO) (UNKNOWN 16)
dicyclomine (UNKNOWN 16)
estradiol (UNKNOWN 16)
simvastatin (UNKNOWN 16)
sulfamethoxazole (From SEPTRA) (DIARRHEA 16)
trimethoprim (From SEPTRA) (DIARRHEA 16)
 
Reconcile Medications
Albuterol Sulfate (Ventolin Hfa) 90 MCG HFA.AER.AD   2 PUF INH Q4-6 PRN PRN 
BREATHING PROBLEMS  (Reported)
Albuterol Sulfate 1.25 MG/3 ML VIAL.NEB   1 Vial INH/SOL Q4-6 PRN COPD
Amitriptyline HCl 25 MG TABLET   1 TAB PO QPM FIBRO  (Reported)
Azithromycin 250 MG TABLET   1 TAB PO DAILY COPD exacerbation
     PLEASE TAKE 1 TAB ON 17 AND ONE TAB ON 17.
Cholecalciferol (Vitamin D3) (Vitamin D) 2,000 UNIT TABLET   4,000 UNIT PO DAILY
SUPP  (Reported)
Citalopram Hydrobromide (Citalopram HBr) 40 MG TABLET   1 TAB PO DAILY MENTAL 
HEALTH  (Reported)
Clonazepam 0.5 MG TABLET   1 TAB PO DAILY AS NEEDED AXNITY  (Reported)
Cyanocobalamin (Vitamin B-12) 1,000 MCG TABLET   1 TAB PO DAILY SUPP  (Reported)
Fish Oil/Borage/Flax/Om3,6,9#1 (Omega 3-6-9 1,200 MG Softgel) 1,200 MG CAPSULE  
1 CAP PO DAILY SUUP  (Reported)
Fluticasone/Salmeterol (Advair 250-50 Diskus) 250 MCG-50 MCG/DOSE BLST.W.DEV   1
PUF INH BID BREATHING PROBLEMS  (Reported)
Melatonin (Melatin) 3 MG TABLET   3 MG PO DAILY INSOMNIA  (Reported)
Metformin HCl (Metformin HCl ER) 500 MG TAB.ER.24H   1 TAB PO SEE ADMIN CRITERIA
DIABETES  (Reported)
     1 TAB MORNING 2 TAB EVENING
Pantoprazole Sodium 40 MG TABLET.DR   1 TAB PO DAILY GI  (Reported)
Prednisone 10 MG TABLET   1 TAB OTHER SI COPD exacerbation
     Please:
     take 4 tabs on 17 then
     take 3 tabs on 17 and 17 then
     take 2 tabs on 17 and 17 then
     take 1 tab on 17 and 17  then
      
     stop.
Rosuvastatin Calcium (Crestor) 10 MG TABLET   1 TAB PO DAILY CHOLESTEROL  (
Reported)
Tiotropium Bromide (Spiriva) 18 MCG CAP.W.DEV   1 CAP INH DAILY BREATHING 
PROBLEMS  (Reported)
 
Triage Note:
66 Y/O FEMALE C/O SOB X 2 MONTHS, "ENOUGH IS
 ENOUGH".  PT WEARS OXYGEN AT BASELINE 2.5L AND
 STATES SHE HAS BEEN GOING TO THE CLINIC WITH NO
 RELIEF ON STEROIDS.  PT REPORTS INTERMITTENT PAIN
 TO CHEST, "SHOULDERBLADES" AND UPPER BACK, 1/10 AT
 THIS TIME.  REPORTS DRY NON PRODUCTIVE COUGH.
 REPORTS GOOD APPETITE/PO INTAKE. SPEAKING CLEARLY
 WITH NO DISTRESS NOTED.  SAT 94% ON 2L.
 EKG AND BLOOD DRAW IN PROGRESS
Triage Nurses Notes Reviewed? yes
Onset: Abrupt
Duration: week(s):, constant, continues in ED
Timing: recent history
Severity: moderate, severe
Activities at Onset: none
HPI:
67-year-old female comes into the emergency room with worsening shortness of 
breath is been going on for the past 2 months getting progressively worse.  She 
has a history of COPD.  She is normally on 2 L of oxygen at nighttime.  She 
reports for the last 2 months she's had increased it to 2 L 24 hours.  She 
denies any chest pain.  Denies any vomiting.  Shortness of breath is worse with 
exertion.
(Cuong Burgos)
 
Past History
 
Travel History
Traveled to [place] past 21 day No
 
Medical History
Any Pertinent Medical History? see below for history
Neurological: NONE
EENT: NONE
Cardiovascular: NONE
Respiratory: COPD, 02 PRN
Gastrointestinal: irritable bowel syndrome
Hepatic: NONE
Renal: NONE
Musculoskeletal: fibromyalgia, osteoarthritis
Psychiatric: NONE
Endocrine: diabetes
Blood Disorders: NONE
Cancer(s): NONE
GYN/Reproductive: NONE
History of MRSA: No
History of VRE: No
History of CDIFF: No
Influenza Vaccine: 10/15/16
 
Surgical History
Surgical History: hysterectomy
 
Psychosocial History
Who do you live with Spouse
[facility]
What is your primary language English
Tobacco Use: Quit >30 days ago
 
Family History
Family History, If Any:
Relation not specified for:
  FH: diabetes mellitus
  FH: lung cancer
 
Hx Contributory? No
(Cuong Burgos)
 
Review of Systems
 
Review of Systems
Constitutional:
Reports: no symptoms. 
EENTM:
Reports: no symptoms. 
Respiratory:
Reports: see HPI. 
Cardiovascular:
Reports: no symptoms. 
GI:
Reports: no symptoms. 
Genitourinary:
Reports: no symptoms. 
Musculoskeletal:
Reports: no symptoms. 
Skin:
Reports: no symptoms. 
Neurological/Psychological:
Reports: no symptoms. 
Hematologic/Endocrine:
Reports: no symptoms. 
Immunologic/Allergic:
Reports: no symptoms. 
All Other Systems: Reviewed and Negative
(Cuong Burgos)
 
Physical Exam
 
Physical Exam
General Appearance: well developed/nourished, alert, awake, mild distress
Head: atraumatic
Eyes:
Bilateral: normal appearance. 
Ears, Nose, Throat: normal pharynx, hearing grossly normal
Neck: normal inspection
Respiratory: no respiratory distress, lungs clear
Cardiovascular: regular rate/rhythm
Extremities: normal inspection
Neurologic/Psych: awake, alert, oriented x 3, normal gait
Skin: intact, normal color
 
Core Measures
ACS in differential dx? Yes
CVA/TIA Diagnosis No
Sepsis Present: No
Sepsis Focused Exam Completed? No
(Cuong Burgos)
 
Progress
Differential Diagnosis: asthma, AMI, bronchitis, CHF, COPD, pericarditis, 
pulmonary embolism, pneumonia, pneumothorax, unstable angina
Plan of Care:
 Orders
 
 
Procedure Date/time Status
 
Consistent Carbohydrate 1  B Active
 
ED Holding Orders  1738 Active
 
Admit to inpatient  1738 Active
 
Vital Signs  1738 Active
 
Code Status  1738 Active
 
CT CHEST WO IV CONTRAST  1716 Active
 
Add-on Test (ER Only)  1555 Active
 
D-DIMER  1314 Complete
 
Telemetry/Cardiac Monitor  1301 Active
 
TROPONIN LEVEL  1301 Complete
 
PROTHROMBIN TIME  1301 Complete
 
COMPREHENSIVE METABOLIC PANEL  1301 Complete
 
CBC WITHOUT DIFFERENTIAL  1301 Complete
 
B-TYPE NATRIURETIC PEP (BNP)  1301 Complete
 
EKG  1257 Active
 
 
 Current Medications
 
 
  Sig/Bruna Start time  Last
 
Medication Dose  Stop Time Status Admin
 
Azithromycin 500 MG ONCE ONE 1730 UNVr 
 
(Zithromax)   1829  
 
Sodium Chloride 250 ML    
 
(Normal Saline 0.9%)     
 
Ceftriaxone Sodium 1,000 MG ONCE ONE 1730 UNVr 
 
(Rocephin)   1731  
 
Methylprednisolone 125 MG ONCE ONE 1730 UNVr 
 
(Solu Medrol)   1731  
 
 
 Laboratory Tests
 
 
 
18 1314:
Anion Gap 15, Estimated GFR > 60, BUN/Creatinine Ratio 18.9, Glucose 181  H, 
Calcium 9.8, Total Bilirubin 0.7, AST 20, ALT 39, Alkaline Phosphatase 56, 
Troponin I < 0.01, Pro-B-Natriuretic Pept 137  H, Total Protein 6.7, Albumin 4.2
, Globulin 2.5, Albumin/Globulin Ratio 1.7, PT 10.4, INR 0.99, D-Dimer High 
Sensitivty < 200, CBC w Diff NO MAN DIFF REQ, RBC 4.13  L, MCV 85.1, MCH 29.0, 
RDW 14.5, MPV 9.0, Gran % 78.9  H, Lymphocytes % 15.4  L, Monocytes % 5.0, 
Eosinophils % 0.6, Basophils % 0.1, Absolute Granulocytes 6.4, Absolute 
Lymphocytes 1.2, Absolute Monocytes 0.4, Absolute Eosinophils 0, Absolute 
Basophils 0, PUBS MCHC 34.1
 
Diagnostic Imaging:
Viewed by Me: Radiology Read.  Discussed w/RAD: Radiology Read. 
Radiology Impression: PATIENT: ANNABELLE MAY  MEDICAL RECORD NO: [id number] PRESENT
AGE: 67  PATIENT ACCOUNT NO: [id number] : 49  LOCATION: [facility] ORDERING 
PHYSICIAN: Mariaa DHILLON     SERVICE DATE:  EXAM TYPE: RAD - 
XRY-CHEST XRAY, TWO VIEWS EXAMINATION: XR CHEST CLINICAL INFORMATION: Shortness 
of breath COMPARISON: Screening chest CT from 2017. Chest x-ray from  TECHNIQUE: 2 views of the chest were obtained. FINDINGS: There are 
postsurgical changes along the right lateral mediastinal contour, stable. There 
is underlying emphysema with increased upper lobe lucency. In comparison to the 
prior studies there is perceived faint increased prominence of the interstitial 
markings in the mid to lower lungs. This may reflect an interstitial pulmonary 
edema versus an atypical infectious/inflammatory process. No pleural effusion or
pneumothorax is visualized. The cardiomediastinal silhouette is within normal 
limits apart from some tortuosity of the thoracic aorta. The spine demonstrate 
some minor degenerative changes. No acute osseous abnormalities are visualized. 
IMPRESSION: Slight increase in the interstitial markings in the mid to lower 
lungs bilaterally superimposed on underlying emphysema and right upper lobe 
postsurgical change. This may reflect an interstitial pulmonary edema or an 
atypical infectious/inflammatory process. DICTATED BY: Raquel Zaragoza MD  DATE/
TIME DICTATED:18 :RAD.WALKER  DATE/TIME TRANSCRIBED:
18 CONFIDENTIAL, DO NOT COPY WITHOUT APPROPRIATE AUTHORIZATION.  <
Electronically signed in Other Vendor System>                                   
                                                    SIGNED BY: Raquel Zaragoza MD 
18 1352
Initial ED EKG: normal sinus rhythm, rate (85)
(Cuong Burgos)
 
Departure
 
Departure
Disposition: STILL A PATIENT
Condition: Stable
Clinical Impression
Primary Impression: COPD exacerbation
Secondary Impressions: Hypoxia
Referrals:
Roxana WALTER,Serge GRACIA (PCP/Family)
 
Departure Forms:
Customer Survey
General Discharge Information
 
Admission Note
Spoke With:
John Mcfadden MD
Documentation of Exam:
Documentation of any treatments & extenuating circumstances including Concerns 
Regarding Discharge (functional status, medication knowledge or non-compliance, 
living conditions, etc.) that warrant an admission rather than observation:  
Patient has failed outpatient treatment with oral steroids.  Patient will 
require IV steroids.  IV antibiotics.  Pulmonary consultation.  pT was 
profoundly short of breath on exertion here.  Medically not safe for discharge. 
I
 
(Cuong Burgos)
 
PA/NP Co-Sign Statement
Statement:
ED Attending supervision documentation-
 
[X] I saw and evaluated the patient. I have also reviewed all the pertinent lab 
results and diagnostic results. I agree with the findings and the plan of care 
as documented in the PA's/NP's documentation. 
 
[X] I have reviewed the ED Record and agree with the PA's/NP's documentation.
 
[] Additions or exceptions (if any) to the PAs/NP's note and plan are 
summarized below:
[]
 
(Roberta WALTER,Louise)
 
Critical Care Note
 
Critical Care Note
Critical Care Time: non-applicable
(Cuong Burgos)

## 2018-01-09 VITALS — DIASTOLIC BLOOD PRESSURE: 74 MMHG | SYSTOLIC BLOOD PRESSURE: 120 MMHG

## 2018-01-09 VITALS — DIASTOLIC BLOOD PRESSURE: 60 MMHG | SYSTOLIC BLOOD PRESSURE: 110 MMHG

## 2018-01-09 VITALS — DIASTOLIC BLOOD PRESSURE: 84 MMHG | SYSTOLIC BLOOD PRESSURE: 124 MMHG

## 2018-01-09 LAB
ABSOLUTE GRANULOCYTE CT: 7.3 /CUMM (ref 1.4–6.5)
BASOPHILS # BLD: 0 /CUMM (ref 0–0.2)
BASOPHILS NFR BLD: 0.3 % (ref 0–2)
EOSINOPHIL # BLD: 0 /CUMM (ref 0–0.7)
EOSINOPHIL NFR BLD: 0 % (ref 0–5)
ERYTHROCYTE [DISTWIDTH] IN BLOOD BY AUTOMATED COUNT: 14.5 % (ref 11.5–14.5)
GRANULOCYTES NFR BLD: 80.8 % (ref 42.2–75.2)
HCT VFR BLD CALC: 34.4 % (ref 37–47)
LYMPHOCYTES # BLD: 1.3 /CUMM (ref 1.2–3.4)
MCH RBC QN AUTO: 29.2 PG (ref 27–31)
MCHC RBC AUTO-ENTMCNC: 34.1 G/DL (ref 33–37)
MCV RBC AUTO: 85.7 FL (ref 81–99)
MONOCYTES # BLD: 0.4 /CUMM (ref 0.1–0.6)
PLATELET # BLD: 289 /CUMM (ref 130–400)
PMV BLD AUTO: 8.9 FL (ref 7.4–10.4)
RED BLOOD CELL CT: 4.01 /CUMM (ref 4.2–5.4)
WBC # BLD AUTO: 9.1 /CUMM (ref 4.8–10.8)

## 2018-01-09 NOTE — DISCHARGE SUMMARY
Visit Information
 
Visit Dates
Admission Date:
01/08/18
 
Discharge Date:
01/10/18
 
 
Hospital Course
 
Course
Attending Physician:
John Mcfadden MD
 
Primary Care Physician:
Serge Schmidt MD
 
Hospital Course:
Ms. Astudillo is a pleasant 66 year old  female with PMH COPD on home O2 
initially at night BUT over the last 2 months she has been using it 24/7. 
fibromyalgia, osteoarthritis and type 2 diabetes mellitus who presented to the 
[facility] ED with a c/o SOB. She had been on home O2 at baseline 2L and home. PTA,
patient receieved oral Prednisone and Zithromax without improvement. Dr. Preciado advised the patient to present for admission. The patient described 
mucous, however non-productive cough, and denied fever or chills.
 
ER Course:
VS: T 97.8, P 90, R 18, BP 1008/69, PO 94% on 2L at rest (pt states 80% on RA 
with minimal exertion)
Physical exams unremarkable except moderate to severe reduced breath sounds in 
bilateral lungs with moderate diffuse wheeze and rhonchi that diminish slightly 
post cough.
Trace edema noticed on BLE.
 
CT-Chest- no infiltrate, stable nodule, + emphysema
 
============================
Patient was admitted to General Medicine for the management of the following:
#COPD Exacerbation w/ Acute on Chronic Hypoxic Respiratory Failure 
Upon admission, patient was started on TRC Nebs + Mucinex, IV solumedrol 40mg 
twice daily, and later tapered down to oral prednisone. Patient was also given 
zithromax for infection prophylaxis in COPD. Patient's sputum culture had no 
growth over hospital course. Patient had no signs of pneumonia on imaging, and 
remained afebrile without leukocytosis over the hosptial stay. Patient was 
discharge with a slow taper of prednisone and 2 more days of oral zithromax. 
Patient was tapered down to her oxygen baseline of 2L NC, and felt remarkably 
improved clinically.
 
#DM2- 
Patient was using Metformin at home. Patient was started on Novolog Sliding 
scale during hosptal stay for blood sugar control.
 
#Hx of GERD, Depression/Anxiety, IBS
Patient was continued on home dose of Omeprazole, Amitriptline, Citalopram, 
Clonazepam, Fibercon/Miralax
 
 
DVT PPX Lovenox+ALPS
Diabetic Diet
Full Code
Allergies:
Coded Allergies:
ampicillin (Intermediate, DIARREHA 01/09/18)
atorvastatin (Intermediate, SEVERE MUSCLE WEAKNESS 01/09/18)
ciprofloxacin (From CIPRO) (Intermediate, RASH 01/09/18)
simvastatin (Intermediate, SEVERE MUSCLE WEAKNESS 01/09/18)
sulfamethoxazole (From SEPTRA) (Intermediate, ITCHING, DIARREHA, MUSCLE WEAKNESS
01/09/18)
dicyclomine (UNKNOWN 04/12/16)
estradiol (UNKNOWN 04/12/16)
trimethoprim (From SEPTRA) (DIARRHEA 04/12/16)
 
Pertinent Lab Results:
SERVICE DATE: 01/08/
EXAM TYPE: RAD - XRY-CHEST XRAY, TWO VIEWS
 
IMPRESSION:
Slight increase in the interstitial markings in the mid to lower lungs
bilaterally superimposed on underlying emphysema and right upper lobe
postsurgical change. This may reflect an interstitial pulmonary edema or an
atypical infectious/inflammatory process.
 
SERVICE DATE: 01/08/
EXAM TYPE: CAT - CT CHEST WO IV CONTRAST
 
IMPRESSION:
 
1. Severe emphysema, stable compared to the 07/17/2017 chest CT. No
superimposed acute changes.
 
 
2. Stable left upper lobe pulmonary nodule dating to 10/23/2015.
 
 
==================================
 
 Laboratory Tests
 
 
 01/10 01/09
 
 0735 0655
 
Chemistry  
 
  Sodium (137 - 145 mmol/L)  142
 
  Potassium (3.5 - 5.1 mmol/L)  4.5
 
  Chloride (98 - 107 mmol/L)  104
 
  Carbon Dioxide (22 - 30 mmol/L)  26
 
  Anion Gap (5 - 16)  12
 
  BUN (7 - 17 mg/dL)  20  H
 
  Creatinine (0.5 - 1.0 mg/dL)  0.8
 
  Estimated GFR (>60 ml/min)  > 60
 
  BUN/Creatinine Ratio (7 - 25 %)  25.0
 
Hematology  
 
  CBC w Diff NO MAN DIFF REQ NO MAN DIFF REQ
 
  WBC (4.8 - 10.8 /CUMM) 10.8 9.1
 
  RBC (4.20 - 5.40 /CUMM) 4.20 4.01  L
 
  Hgb (12.0 - 16.0 G/DL) 12.1 11.7  L
 
  Hct (37 - 47 %) 36.2  L 34.4  L
 
  MCV (81.0 - 99.0 FL) 86.3 85.7
 
  MCH (27.0 - 31.0 PG) 28.8 29.2
 
  RDW (11.5 - 14.5 %) 14.7  H 14.5
 
  Plt Count (130 - 400 /CUMM) 296 289
 
  MPV (7.4 - 10.4 FL) 8.8 8.9
 
  Gran % (42.2 - 75.2 %) 51.6 80.8  H
 
  Lymphocytes % (20.5 - 51.1 %) 39.7 14.3  L
 
  Monocytes % (1.7 - 9.3 %) 7.1 4.6
 
  Eosinophils % (0 - 5 %) 1.3 0
 
  Basophils % (0.0 - 2.0 %) 0.3 0.3
 
  Absolute Granulocytes (1.4 - 6.5 /CUMM) 5.6 7.3  H
 
  Absolute Lymphocytes (1.2 - 3.4 /CUMM) 4.3  H 1.3
 
  Absolute Monocytes (0.10 - 0.60 /CUMM) 0.8  H 0.4
 
  Absolute Eosinophils (0.0 - 0.7 /CUMM) 0.1 0
 
  Absolute Basophils (0.0 - 0.2 /CUMM) 0 0
 
  PUBS MCHC (33.0 - 37.0 G/DL) 33.3 34.1
 
 
 
 
 01/08
 
 1314
 
Chemistry 
 
  Sodium (137 - 145 mmol/L) 143
 
  Potassium (3.5 - 5.1 mmol/L) 4.5
 
  Chloride (98 - 107 mmol/L) 105
 
  Carbon Dioxide (22 - 30 mmol/L) 22
 
  Anion Gap (5 - 16) 15
 
  BUN (7 - 17 mg/dL) 17
 
  Creatinine (0.5 - 1.0 mg/dL) 0.9
 
  Estimated GFR (>60 ml/min) > 60
 
  BUN/Creatinine Ratio (7 - 25 %) 18.9
 
  Glucose (65 - 99 mg/dL) 181  H
 
  Calcium (8.4 - 10.2 mg/dL) 9.8
 
  Total Bilirubin (0.2 - 1.3 mg/dL) 0.7
 
  AST (14 - 36 U/L) 20
 
  ALT (9 - 52 U/L) 39
 
  Alkaline Phosphatase (<127 U/L) 56
 
  Troponin I (< 0.11 ng/ml) < 0.01
 
  Pro-B-Natriuretic Pept (<125 pg/mL) 137  H
 
  Total Protein (6.3 - 8.2 g/dL) 6.7
 
  Albumin (3.5 - 5.0 g/dL) 4.2
 
  Globulin (1.9 - 4.2 gm/dL) 2.5
 
  Albumin/Globulin Ratio (1.1 - 2.2 %) 1.7
 
Coagulation 
 
  PT (9.4 - 12.5 SEC) 10.4
 
  INR (0.90 - 1.19) 0.99
 
  D-Dimer High Sensitivty (0 - 243 ng/ml) < 200
 
Hematology 
 
  CBC w Diff NO MAN DIFF REQ
 
  WBC (4.8 - 10.8 /CUMM) 8.1
 
  RBC (4.20 - 5.40 /CUMM) 4.13  L
 
  Hgb (12.0 - 16.0 G/DL) 12.0
 
  Hct (37 - 47 %) 35.1  L
 
  MCV (81.0 - 99.0 FL) 85.1
 
  MCH (27.0 - 31.0 PG) 29.0
 
  RDW (11.5 - 14.5 %) 14.5
 
  Plt Count (130 - 400 /CUMM) 272
 
  MPV (7.4 - 10.4 FL) 9.0
 
  Gran % (42.2 - 75.2 %) 78.9  H
 
  Lymphocytes % (20.5 - 51.1 %) 15.4  L
 
  Monocytes % (1.7 - 9.3 %) 5.0
 
  Eosinophils % (0 - 5 %) 0.6
 
  Basophils % (0.0 - 2.0 %) 0.1
 
  Absolute Granulocytes (1.4 - 6.5 /CUMM) 6.4
 
  Absolute Lymphocytes (1.2 - 3.4 /CUMM) 1.2
 
  Absolute Monocytes (0.10 - 0.60 /CUMM) 0.4
 
  Absolute Eosinophils (0.0 - 0.7 /CUMM) 0
 
  Absolute Basophils (0.0 - 0.2 /CUMM) 0
 
  PUBS MCHC (33.0 - 37.0 G/DL) 34.1
 
 
 
 
Disposition Summary
 
Disposition
Principal Diagnosis:
#COPD Exacerbation w/ Bronchitis
#Acute on Chronic hypoxic resp failure
#T2DM
#GERD
#Depression/Anxiety
Additional Diagnosis:
As above
Discharge Disposition: home or self care
 
Discharge Instructions
 
General Discharge Information
Code Status: Full Code
Patient's Diet:
Regular
Patient's Activity:
As tolerated
Follow-Up Instructions/Appts:
- Please follow up with your pulmonologist Dr. Preciado within 1-2 week of 
discharge.
- Please follow up with your primary care physician within 1-2 week of 
discharge. Inform your primary care physician of this admission to [facility].
- Continue your current medications per discharge instructions.
- Please watch for these problems: Fever, Chills, Nausea, Vomiting, Shortness of
Breath, Productive Cough, Chest Pain/Discomfort, Abdominal Pain, Active Bleeding
or Bloody urine/stool.
 
Medications at Discharge
Discharge Medications:
Stop taking the following medications:
Azithromycin (Azithromycin) 250 MG TABLET ORAL DAILY Qty = 2
 
Continue taking these medications:
Pantoprazole Sodium (Pantoprazole Sodium) 40 MG TABLET.DR
    1 Tablet ORAL DAILY
    Qty = 90
    Comments:
       PRILOSEC Last Taken: 1/10/18
             Time: 11:00AM
 
Fluticasone/Salmeterol (Advair 250-50 Diskus) 250 MCG-50 MCG/DOSE BLST.W.DEV
    1 Puff Inhale through mouth TWICE DAILY
    Qty = 180
    Comments:
       NOT GIVEN WHILE HOSPITALIZED,
       SYMBICORT GIVEN 2/15/17 AT 10:00 AM
 
Citalopram Hydrobromide (Citalopram HBr) 40 MG TABLET
    1 Tablet ORAL DAILY
    Qty = 90
    Comments:
       LAST DOSE GIVEN 2/14/17 AT 10:00 PM
 
Metformin HCl (Metformin HCl ER) 500 MG TAB.ER.24H
    1 Tablet ORAL SEE INSTRUCTIONS
    Qty = 270
    Instructions:
       1 TAB MORNING 2 TAB EVENING
    Comments:
       NOT GIVEN WHILE HOSITALIZED
 
Rosuvastatin Calcium (Crestor) 10 MG TABLET
    1 Tablet ORAL DAILY
    Qty = 90
    Comments:
       NOT GIVEN WHILE HOSPITALIZED
 
Tiotropium Bromide (Spiriva) 18 MCG CAP.W.DEV
    1 Capsule Inhale through mouth DAILY
    Qty = 90
    Comments:
       Last Taken: 1/10/18
             Time: 11:00AM
 
Amitriptyline HCl (Amitriptyline HCl) 25 MG TABLET
    1 Tablet ORAL Every night
    Qty = 90
    Comments:
       Last Taken: 1/9/18
             Time: 9:00PM
 
Albuterol Sulfate (Ventolin Hfa) 90 MCG HFA.AER.AD
    2 Puff Inhale through mouth EVERY 4-6 HOURS AS NEEDED as needed for 
BREATHING PROBLEMS
    Qty = 54
    Comments:
       NOT GIVEN WHILE HOSPITALIZED
 
Fish Oil/Borage/Flax/Om3,6,9#1 (Omega 3-6-9 1,200 MG Softgel) 1,200 MG CAPSULE
    1 Capsule ORAL DAILY
    Comments:
       Last Taken: 1/9/18
             Time: 9:00 PM
 
Cyanocobalamin (Vitamin B-12) 1,000 MCG TABLET
    1 Tablet ORAL DAILY
    Comments:
       Last Taken: 1/10/18
             Time: 11:00 AM
 
Cholecalciferol (Vitamin D3) (Vitamin D) 2,000 UNIT TABLET
    4,000 Unit ORAL DAILY
    Comments:
       NOT GIVEN DURING THIS ADMISSION
 
Melatonin (Melatin) 3 MG TABLET
    3 Milligram ORAL DAILY
    Comments:
       Last Taken: 1/9/18
             Time: 9:00PM
 
Clonazepam (Clonazepam) 0.5 MG TABLET
    1 Tablet ORAL DAILY AS NEEDED
    Comments:
       NOT GIVEN WHILE HOSPITALIZED
 
Prednisone (Prednisone) 10 MG TABLET
    1 Tablet OTHER See Instructions
    Qty = 16
    Instructions:
       Please:
       take 4 tabs on 2/16/17 then
       take 3 tabs on 2/17/17 and 2/18/17 then
       take 2 tabs on 2/19/17 and 2/20/17 then
       take 1 tab on 2/21/17 and 2/22/17  then
        
       stop.
    Comments:
       60 MG Last Taken: 1/10/18
             Time: 11:00AM
 
Albuterol Sulfate (Albuterol Sulfate) 1.25 MG/3 ML VIAL.NEB
    1 Vial Inhale Solution EVERY 4-6 HOURS as needed for COPD
    Qty = 150
 
Start taking the following new medications:
Prednisone (Prednisone) 10 MG TABLET
    1 Tablet ORAL TWICE DAILY
    Qty = 30
    No Refills
    Instructions:
       .
    Comments:
       1/11-12:Please take 5 tablets, once daily
       1/13-14:Please take 4 tablets, once daily
       1/15-16:Please take 3 tablets, once daily
       1/17-18:Please take 2 tablets, once daily
       1/19-20:Please take 1 tablets, once daily
       1/21: STOP
 
Azithromycin (Zithromax) 250 MG TABLET
    1 Tablet ORAL DAILY
    Qty = 2
    No Refills
 
 
Copies To:
Roxana WALTER,Serge GRACIA

## 2018-01-09 NOTE — PN- HOUSESTAFF
GarciaRadha 18 0753:
Subjective
Follow-up For:
#COPD Exacerbation w/ Bronchitis
#Acute on Chronic hypoxic resp failure
#T2DM
#GERD
#Depression/Anxiety
Subjective:
No overnight event. Patient felt much improved on breathing, currently on 
2.5LNC. Patient acknowledged that she needed to bring the Rouvastatins from 
home. Acknowledged that we are going to taper her down on IV steroid. 
 
Review of Systems
Constitutional:
Reports: see HPI. 
 
Objective
Last 24 Hrs of Vital Signs/I&O
 Vital Signs
 
 
Date Time Temp Pulse Resp B/P B/P Pulse O2 O2 Flow FiO2
 
     Mean Ox Delivery Rate 
 
 0457 98.5 74 20 110/60  92 Nasal 2.5L 
 
       Cannula  
 
 0000      97 Nasal 2.5L 
 
       Cannula  
 
 2232 97.7 94 20 110/64  94 Nasal 2.5L 
 
       Cannula  
 
 2213       Nasal 2.5L 
 
       Cannula  
 
 2101       Nasal 3.0L 
 
       Cannula  
 
 1923 97.2 99 18 103/61  95 Nasal 3.0L 
 
       Cannula  
 
 1644      93 Nasal 3.0L 
 
       Cannula  
 
 1618 97.8 83 18 122/64  94 Nasal 3.0L 
 
       Cannula  
 
 1613      95 Nasal 2.5L 
 
       Cannula  
 
 1308 97.8 90 18 108/69  94 Nasal 2.0L 
 
       Cannula  
 
 
 Intake & Output
 
 
  1600  0800  0000
 
Intake Total  240 360
 
Output Total   500
 
Balance  240 -140
 
    
 
Intake, Oral  240 360
 
Output, Urine   500
 
Patient   77.111 kg
 
Weight   
 
Weight   Reported by Patient
 
Measurement   
 
Method   
 
 
 
 
Physical Exam
General Appearance: Alert, Oriented X3, Cooperative, No Acute Distress
Cardiovascular: Regular Rate
Lungs: Normal Air Movement, mild rhonchi bilateral lung bases, no wheezing heard
Abdomen: Soft
Neurological: Normal Speech
Extremities: No Edema, Normal Pulses
Current Medications:
 Current Medications
 
 
  Sig/Bruna Start time  Last
 
Medication Dose Route Stop Time Status Admin
 
Acetaminophen 650 MG Q6P PRN 1815 AC 
 
  PO   
 
Acetaminophen 1,000 MG Q6P PRN  181 AC 
 
  IV   
 
Albuterol Sulfate 3 ML TID  1000 AC 
 
  INH   
 
Albuterol Sulfate 2 PUF Q4-6 PRN PRN  1930 AC 
 
  INH   
 
Albuterol Sulfate 3 ML ONCE ONE  1600 DC 
 
  INH  1601  1644
 
Amitriptyline HCl 25 MG QPM  2200 AC 
 
  PO   2230
 
Atorvastatin Calcium 40 MG 1700  1700 AC 
 
  PO   
 
Azithromycin 500 MG 1800  1800 AC 
 
Dextrose/Water 250 ML IV   
 
Azithromycin 500 MG DAILY  1000 DC 
 
Sodium Chloride 250 ML IV   
 
Azithromycin 500 MG DAILY  1000 DC 
 
Dextrose/Water 250 ML IV   
 
Azithromycin 500 MG ONCE ONE  1730 DC 
 
Sodium Chloride 250 ML IV  1829  1810
 
Budesonide/ 2 PUF BID  2200 AC 
 
Formoterol Fumarate  INH   2230
 
Ceftriaxone Sodium 1,000 MG DAILY  1000 CAN 
 
  IV   
 
Ceftriaxone Sodium 0 .STK-MED ONE  1811 DC 
 
  .ROUTE   
 
Ceftriaxone Sodium 1,000 MG ONCE ONE  1730 DC 
 
  IV  173  1810
 
Citalopram  40 MG QPM  2200 AC 
 
Hydrobromide  PO   2233
 
Clonazepam 0.5 MG DAILY AS NEEDED  1930 AC 
 
  PO 01/15 192  
 
Cyanocobalamin 1,000 MCG DAILY  1925 AC 
 
  PO   2229
 
Enoxaparin Sodium 40 MG DAILY  1000 AC 
 
  SC   
 
Fish Oil 1,050 MG QPM  2200 AC 
 
  PO   2229
 
Guaifenesin 600 MG Q12  1000 AC 
 
  PO   
 
Insulin Human Regular 0 TIDAC/HS  2100 AC 
 
  SC   0803
 
Melatonin 3 MG 2200  2200 AC 
 
  PO   2230
 
Methylprednisolone 40 MG Q12  2200 AC 
 
  IV   2230
 
Methylprednisolone 0 .STK-MED ONE  1821 DC 
 
  .ROUTE   
 
Methylprednisolone 125 MG ONCE ONE  1730 DC 
 
  IV  1731  1818
 
Omeprazole 40 MG QAM  1000 AC 
 
  PO   
 
Omeprazole 40 MG QPM  2200 DC 
 
  PO   
 
Tiotropium Bromide 1 PUF DAILY  192 AC 
 
  INH   
 
 
 
 
Last 24 Hrs of Lab/Gunnar Results
Last 24 Hrs of Labs/Mics:
 Laboratory Tests
 
18 0655:
Sodium Pending, Potassium Pending, Chloride Pending, Carbon Dioxide Pending, 
Anion Gap Pending, BUN Pending, Creatinine Pending, BUN/Creatinine Ratio Pending
, CBC w Diff Pending, WBC Pending, RBC Pending, Hgb Pending, Hct Pending, MCV 
Pending, MCH Pending, RDW Pending, Plt Count Pending, MPV Pending, PUBS MCHC 
Pending
 
18 1314:
Anion Gap 15, Estimated GFR > 60, BUN/Creatinine Ratio 18.9, Glucose 181  H, 
Calcium 9.8, Total Bilirubin 0.7, AST 20, ALT 39, Alkaline Phosphatase 56, 
Troponin I < 0.01, Pro-B-Natriuretic Pept 137  H, Total Protein 6.7, Albumin 4.2
, Globulin 2.5, Albumin/Globulin Ratio 1.7, PT 10.4, INR 0.99, D-Dimer High 
Sensitivty < 200, CBC w Diff NO MAN DIFF REQ, RBC 4.13  L, MCV 85.1, MCH 29.0, 
RDW 14.5, MPV 9.0, Gran % 78.9  H, Lymphocytes % 15.4  L, Monocytes % 5.0, 
Eosinophils % 0.6, Basophils % 0.1, Absolute Granulocytes 6.4, Absolute 
Lymphocytes 1.2, Absolute Monocytes 0.4, Absolute Eosinophils 0, Absolute 
Basophils 0, PUBS MCHC 34.1
 Microbiology
2345  URINE ROUT: Legionella Antigen - COMP
2345  URINE ROUT: Streptococcus pneumoniae Antigen (M - COMP
1854  LOWER RESP: Respiratory Culture - COLB
1854  LOWER RESP: Gram Stain - COLB
 
 
 
Assessment/Plan
Assessment:
Ms. Astudillo is a pleasant 66 year old  female with PMH COPD on home O2 
initially at night BUT over the last 2 months she has been using it . 
fibromyalgia, osteoarthritis and type 2 diabetes mellitus who presented to the 
[place] ED with a c/o SOB. She had been on home O2 at baseline 2L and home. PTA,
patient receieved oral Prednisone and Zithromax without improvement. Dr. Preciado advised the patient to present for admission. The patient described 
mucous, however non-productive cough, and denied fever or chills.
 
ER Course:
VS: T 97.8, P 90, R 18, BP 1008/69, PO 94% on 2L at rest (pt states 80% on RA 
with minimal exertion)
Physical exams unremarkable except moderate to severe reduced breath sounds in 
bilateral lungs with moderate diffuse wheeze and rhonchi that diminish slightly 
post cough.
Trace edema noticed on BLE.
 
CT-Chest- no infiltrate, stable nodule, + emphysema
 
============================
Patient was admitted to General Medicine for the management of the following:
#COPD Exacerbation w/ Acute on Chronic Hypoxic Respiratory Failure 
- TRC Nebs + Mucinex, IV Solumedrol 40mg q12, tapered to daily today, Continued 
IV Zithromax
- Pending sputum C&S
- Unlikely pneumonia per imaging, will continue monitor
- Patient currently tapered down to baseline 2L and breathing comfortbly w/o 
wheezing
 
#DM2- normally on Metformin. Expect increase with steroids.
- Novolog SS + AccuChek
 
#Hx of GERD, Depression/Anxiety, IBS
- Patient was continued on Omeprazole, Amitriptline, Citalopram, Clonazepam, 
Fibercon/Miralax
 
 
DVT PPX Lovenox+ALPS
Diabetic Diet
Full Code
Problem List:
 1. IBS (irritable bowel syndrome)
 
 2. Fibromyalgia
 
 3. COPD exacerbation
 
 4. Hypoxia
 
Pain Ratin
Pain Location:
NA
Pain Goal: Remain pain free
Pain Plan:
see AP
Tomorrow's Labs & Rationales:
STEPH HydeCarlosteven 18 1251:
Attending MD Review Statement
 
Attending Statement
Attending MD Statement: examined this patient, discuss w/resident/PA/NP, agreed 
w/resident/PA/NP, discussed with family, reviewed EMR data (avail), discussed 
with nursing, discussed with case mgmt, reviewed images, amended to note
Attending Assessment/Plan:
#COPD Exacerbation with Bronchitis improving-  
Plan: continue treatment. TRC Nebs, Taper ateroids, abx. Pulmonary inform. 
 
#Acute on Chronic Hypoxic Respiratory Failure- improving. check walking pulse 
oxi before dc.
 
#DM2- normally on Metformin. Expect increase with steroids.
Plan: RISS and sliding scale insulin.
 
#GERD- c/w PPI
 
#Depression/Anxiety- on Amitriptline, Citalopram, Clonazepam.
Plan: Continue meds.

## 2018-01-09 NOTE — PATIENT DISCHARGE INSTRUCTIONS
Discharge Instructions
 
General Discharge Information
You were seen/treated for:
#COPD Exacerbation w/ Bronchitis
#Acute on Chronic hypoxic resp failure
#T2DM
#GERD
#Depression/Anxiety
Special Instructions:
- Please follow up with your pulmonologist Dr. Preciado within 1-2 week of 
discharge.
- Please follow up with your primary care physician within 1-2 week of 
discharge. Inform your primary care physician of this admission to [facility].
- Continue your current medications per discharge instructions.
- Please watch for these problems: Fever, Chills, Nausea, Vomiting, Shortness of
Breath, Productive Cough, Chest Pain/Discomfort, Abdominal Pain, Active Bleeding
or Bloody urine/stool.
 
Diet
Continue normal diet: Yes
 
Activity
Full Activity/No Limits: Yes
 
Acute Coronary Syndrome
 
Inclusion Criteria
At DC or during hospital stay patient has or had the following:
ACS DIAGNOSIS No
 
Discharge Core Measures
Meds if any: Prescribed or Continued at Discharge
Meds if any: NOT Prescribed or Continued at Discharge
 
Congestive Heart Failure
 
Inclusion Criteria
At DC or during hospital stay patient has or had the following:
CHF DIAGNOSIS No
 
Discharge Core Measures
Meds if any: Prescribed or Continued at Discharge
Meds if any: NOT Prescribed or Continued at Discharge
 
Cerebrovascular accident
 
Inclusion Criteria
At DC or during hospital stay patient has or had the following:
CVA/TIA Diagnosis No
 
Discharge Core Measures
Meds if any: Prescribed or Continued at Discharge
Meds if any: NOT Prescribed or Continued at Discharge
 
Venous thromboembolism
 
Inclusion Criteria
VTE Diagnosis No
VTE Type NONE
VTE Confirmed by (Test) NONE
 
Discharge Core Measures
- Per Current guidelines, there needs to be overlap
- treatment for the first 5 days of Warfarin therapy.
- If discharged on Warfarin prior to 5 days of
- overlap therapy, the patient will need to be
- assessed for post discharge needs including
- *Post discharge parental anticoagulation
- *Warfarin and/or parental anticoagulation education
- *Follow up date to check INR post discharge
At least 5 days overlap therapy as Inpatient No
Meds if any: Prescribed or Continued at Discharge
Note: Overlap Therapy is Warfarin and Anticoagulant
Meds if any: NOT Prescribed or Continued at Discharge

## 2018-01-10 VITALS — SYSTOLIC BLOOD PRESSURE: 136 MMHG | DIASTOLIC BLOOD PRESSURE: 84 MMHG

## 2018-01-10 LAB
ABSOLUTE GRANULOCYTE CT: 5.6 /CUMM (ref 1.4–6.5)
BASOPHILS # BLD: 0 /CUMM (ref 0–0.2)
BASOPHILS NFR BLD: 0.3 % (ref 0–2)
EOSINOPHIL # BLD: 0.1 /CUMM (ref 0–0.7)
EOSINOPHIL NFR BLD: 1.3 % (ref 0–5)
ERYTHROCYTE [DISTWIDTH] IN BLOOD BY AUTOMATED COUNT: 14.7 % (ref 11.5–14.5)
GRANULOCYTES NFR BLD: 51.6 % (ref 42.2–75.2)
HCT VFR BLD CALC: 36.2 % (ref 37–47)
LYMPHOCYTES # BLD: 4.3 /CUMM (ref 1.2–3.4)
MCH RBC QN AUTO: 28.8 PG (ref 27–31)
MCHC RBC AUTO-ENTMCNC: 33.3 G/DL (ref 33–37)
MCV RBC AUTO: 86.3 FL (ref 81–99)
MONOCYTES # BLD: 0.8 /CUMM (ref 0.1–0.6)
PLATELET # BLD: 296 /CUMM (ref 130–400)
PMV BLD AUTO: 8.8 FL (ref 7.4–10.4)
RED BLOOD CELL CT: 4.2 /CUMM (ref 4.2–5.4)
WBC # BLD AUTO: 10.8 /CUMM (ref 4.8–10.8)

## 2018-01-10 NOTE — PN- HOUSESTAFF
Radha Garcia 01/10/18 1038:
Subjective
Follow-up For:
#COPD Exacerbation w/ Bronchitis
#Acute on Chronic hypoxic resp failure
#T2DM
#GERD
#Depression/Anxiety
Subjective:
No overnight event. Patient felt much improved without specific complaint.
 
Review of Systems
Constitutional:
Reports: see HPI. 
 
Objective
Last 24 Hrs of Vital Signs/I&O
 Vital Signs
 
 
Date Time Temp Pulse Resp B/P B/P Pulse O2 O2 Flow FiO2
 
     Mean Ox Delivery Rate 
 
01/10 0834      94 Nasal 3.0L 
 
       Cannula  
 
01/10 0608 98.3 95 18 136/84  95 Nasal 2.5L 
 
       Cannula  
 
01/10 0000       Nasal 2.5L 
 
       Cannula  
 
 2211 97.8 76 18 124/84  96 Nasal 3.0L 
 
       Cannula  
 
 1930       Nasal 3.0L 
 
       Cannula  
 
 1600      95 Nasal 2.5L 
 
       Cannula  
 
 1451 98.2 87 20 120/74  94 Nasal  
 
       Cannula  
 
 1131      91 Nasal 2.5L 
 
       Cannula  
 
 
 Intake & Output
 
 
 01/10 1600 01/10 0800 01/10 0000
 
Intake Total  490 850
 
Output Total   
 
Balance  490 850
 
    
 
Intake, IV  10 10
 
Intake, Oral  480 840
 
Number  1 0
 
Bowel   
 
Movements   
 
 
 
 
Physical Exam
General Appearance: Alert, Oriented X3, Cooperative, No Acute Distress
Cardiovascular: Regular Rate
Lungs: Normal Air Movement, some mild rhonchi b/l lungs but no wheezing
Abdomen: Normal Bowel Sounds, Soft, No Tenderness
Neurological: Normal Speech
Current Medications:
 Current Medications
 
 
  Sig/Bruna Start time  Last
 
Medication Dose Route Stop Time Status Admin
 
Acetaminophen 650 MG Q6P PRN  1815 AC 
 
  PO   
 
Acetaminophen 1,000 MG Q6P PRN  1815 AC 
 
  IV   
 
Albuterol Sulfate 3 ML TID  1000 AC 01/10
 
  INH   0831
 
Albuterol Sulfate 2 PUF Q4-6 PRN PRN  1930 AC 
 
  INH   
 
Amitriptyline HCl 25 MG QPM  2200 AC 
 
  PO   2047
 
Atorvastatin Calcium 40 MG 1700  1700 CAN 
 
  PO   
 
Azithromycin 500 MG 1800  1800 AC 
 
Dextrose/Water 250 ML IV   1705
 
Budesonide/ 2 PUF BID 2200 AC 
 
Formoterol Fumarate  INH   204
 
Citalopram  40 MG QPM  220 AC 
 
Hydrobromide  PO   204
 
Clonazepam 0.5 MG DAILY AS NEEDED 1930 AC 
 
  PO 01/15 1929  
 
Cyanocobalamin 1,000 MCG DAILY  192 AC 
 
  PO   0911
 
Enoxaparin Sodium 40 MG DAILY  1000 AC 
 
  SC   0912
 
Fish Oil 1,050 MG QPM  2200 AC 
 
  PO   2047
 
Guaifenesin 600 MG Q12  1000 AC 
 
  PO   2047
 
Insulin Aspart 0 TIDAC  1700 AC 
 
  SC   1703
 
Insulin Human Regular 6 UNITS .STK-MED ONE  1140 DC 
 
  IV  1141  
 
Insulin Human Regular 0 TIDAC/HS  2100 DC 
 
  SC   1212
 
Melatonin 3 MG 2200  2200 AC 
 
  PO   204
 
Methylprednisolone 40 MG DAILY 01/10 1000 CAN 
 
  IV   
 
Methylprednisolone 40 MG Q12  2200 DC 
 
  IV   0913
 
Omeprazole 40 MG QAM  1000 AC 
 
  PO   0911
 
Polycarbophil 1,250 MG QPM  2200 AC 
 
  PO   2047
 
Polyethylene Glycol 17 GM DAILY  1000 AC 
 
  PO   0912
 
Prednisone 60 MG DAILY 01/10 1000 AC 
 
  PO   
 
Rosuvastatin Calcium 10 MG 1700  1700 AC 
 
  PO   1703
 
Tiotropium Ray 1 PUF DAILY  192 AC 
 
  INH   0910
 
 
 
 
Last 24 Hrs of Lab/Gunnar Results
Last 24 Hrs of Labs/Mics:
 Laboratory Tests
 
01/10/18 0735:
CBC w Diff Pending, WBC Pending, RBC Pending, Hgb Pending, Hct Pending, MCV 
Pending, MCH Pending, RDW Pending, Plt Count Pending, MPV Pending, Gran % 
Pending, Lymphocytes % Pending, Monocytes % Pending, Eosinophils % Pending, 
Basophils % Pending, Absolute Granulocytes Pending, Absolute Lymphocytes Pending
, Absolute Monocytes Pending, Absolute Eosinophils Pending, Absolute Basophils 
Pending, PUBS MCHC Pending
 
 
Assessment/Plan
Assessment:
Ms. Astudillo is a pleasant 66 year old  female with PMH COPD on home O2 
initially at night BUT over the last 2 months she has been using it /. 
fibromyalgia, osteoarthritis and type 2 diabetes mellitus who presented to the 
[place] ED with a c/o SOB. She had been on home O2 at baseline 2L and home. PTA,
patient receieved oral Prednisone and Zithromax without improvement. Dr. Preciado advised the patient to present for admission. The patient described 
mucous, however non-productive cough, and denied fever or chills.
 
ER Course:
VS: T 97.8, P 90, R 18, BP 1008/69, PO 94% on 2L at rest (pt states 80% on RA 
with minimal exertion)
Physical exams unremarkable except moderate to severe reduced breath sounds in 
bilateral lungs with moderate diffuse wheeze and rhonchi that diminish slightly 
post cough.
Trace edema noticed on BLE.
 
CT-Chest- no infiltrate, stable nodule, + emphysema
 
============================
Patient was admitted to General Medicine for the management of the following:
#COPD Exacerbation w/ Acute on Chronic Hypoxic Respiratory Failure 
- TRC Nebs + Mucinex, Po prednisone 60mg today, Continued IV Zithromax day 3, 
will give 2 more days w/ oral zithromax
- Pending sputum C&S
- Unlikely pneumonia per imaging, will continue monitor
- Patient currently tapered down to baseline 2L and breathing comfortbly w/o 
wheezing
 
#DM2- normally on Metformin. Expect increase with steroids.
- Novolog SS + AccuChek
 
#Hx of GERD, Depression/Anxiety, IBS
- Patient was continued on Omeprazole, Amitriptline, Citalopram, Clonazepam, 
Fibercon/Miralax
 
 
DVT PPX Lovenox+ALPS
Diabetic Diet
Full Code
Problem List:
 1. Hypoxia
 
 2. IBS (irritable bowel syndrome)
 
 3. Fibromyalgia
 
 4. COPD exacerbation
 
Pain Ratin
Pain Location:
NA
Pain Goal: Remain pain free
Pain Plan:
see Ap  
Tomorrow's Labs & Rationales:
CBC/BEP
 
 
BarrettEladio turner 01/10/18 1110:
Attending MD Review Statement
 
Attending Statement
Attending MD Statement: examined this patient, discuss w/resident/PA/NP, agreed 
w/resident/PA/NP, discussed with family, reviewed EMR data (avail), discussed 
with nursing, discussed with case mgmt, reviewed images, amended to note
Attending Assessment/Plan:
#COPD Exacerbation with Bronchitis improving-  
Plan: continue treatment. TRC Nebs, Taper ateroids, abx. Pulmonary informed. 
 
#Acute on Chronic Hypoxic Respiratory Failure- improving. Patient on baseline or
near baseline oxygen for COPD
 
#DM2- normally on Metformin. Expect increase with steroids.
Plan: RISS and sliding scale insulin.
 
#GERD- c/w PPI
 
#Depression/Anxiety- on Amitriptline, Citalopram, Clonazepam.
 
Case management for dc planning , home situaton with pipe burst. Patient known 
to pulmonary group here and follow outpatient at [facility] also for iv 
solu-medrol if needed.
